# Patient Record
Sex: FEMALE | Race: OTHER | NOT HISPANIC OR LATINO | URBAN - METROPOLITAN AREA
[De-identification: names, ages, dates, MRNs, and addresses within clinical notes are randomized per-mention and may not be internally consistent; named-entity substitution may affect disease eponyms.]

---

## 2020-03-05 ENCOUNTER — INPATIENT (INPATIENT)
Facility: HOSPITAL | Age: 75
LOS: 10 days | Discharge: HOME | End: 2020-03-16
Attending: HOSPITALIST
Payer: MEDICARE

## 2020-03-05 VITALS
DIASTOLIC BLOOD PRESSURE: 76 MMHG | RESPIRATION RATE: 18 BRPM | TEMPERATURE: 98 F | OXYGEN SATURATION: 100 % | HEART RATE: 96 BPM | SYSTOLIC BLOOD PRESSURE: 161 MMHG

## 2020-03-05 DIAGNOSIS — N17.9 ACUTE KIDNEY FAILURE, UNSPECIFIED: ICD-10-CM

## 2020-03-05 LAB
ALBUMIN SERPL ELPH-MCNC: 4.2 G/DL — SIGNIFICANT CHANGE UP (ref 3.3–5)
ALP SERPL-CCNC: 65 U/L — SIGNIFICANT CHANGE UP (ref 40–120)
ALT FLD-CCNC: 6 U/L — SIGNIFICANT CHANGE UP (ref 4–33)
ANION GAP SERPL CALC-SCNC: 22 MMO/L — HIGH (ref 7–14)
APPEARANCE UR: CLEAR — SIGNIFICANT CHANGE UP
AST SERPL-CCNC: 14 U/L — SIGNIFICANT CHANGE UP (ref 4–32)
BACTERIA # UR AUTO: NEGATIVE — SIGNIFICANT CHANGE UP
BASE EXCESS BLDV CALC-SCNC: -9.8 MMOL/L — SIGNIFICANT CHANGE UP
BASOPHILS # BLD AUTO: 0.05 K/UL — SIGNIFICANT CHANGE UP (ref 0–0.2)
BASOPHILS NFR BLD AUTO: 0.9 % — SIGNIFICANT CHANGE UP (ref 0–2)
BILIRUB SERPL-MCNC: 0.3 MG/DL — SIGNIFICANT CHANGE UP (ref 0.2–1.2)
BILIRUB UR-MCNC: NEGATIVE — SIGNIFICANT CHANGE UP
BLOOD GAS VENOUS - CREATININE: 14.5 MG/DL — HIGH (ref 0.5–1.3)
BLOOD GAS VENOUS - FIO2: 21 — SIGNIFICANT CHANGE UP
BLOOD UR QL VISUAL: SIGNIFICANT CHANGE UP
BUN SERPL-MCNC: 106 MG/DL — HIGH (ref 7–23)
CALCIUM SERPL-MCNC: 9.7 MG/DL — SIGNIFICANT CHANGE UP (ref 8.4–10.5)
CHLORIDE BLDV-SCNC: 103 MMOL/L — SIGNIFICANT CHANGE UP (ref 96–108)
CHLORIDE SERPL-SCNC: 96 MMOL/L — LOW (ref 98–107)
CO2 SERPL-SCNC: 14 MMOL/L — LOW (ref 22–31)
COLOR SPEC: COLORLESS — SIGNIFICANT CHANGE UP
CREAT SERPL-MCNC: 14.04 MG/DL — HIGH (ref 0.5–1.3)
EOSINOPHIL # BLD AUTO: 0.12 K/UL — SIGNIFICANT CHANGE UP (ref 0–0.5)
EOSINOPHIL NFR BLD AUTO: 2.3 % — SIGNIFICANT CHANGE UP (ref 0–6)
GAS PNL BLDV: 131 MMOL/L — LOW (ref 136–146)
GLUCOSE BLDV-MCNC: 78 MG/DL — SIGNIFICANT CHANGE UP (ref 70–99)
GLUCOSE SERPL-MCNC: 86 MG/DL — SIGNIFICANT CHANGE UP (ref 70–99)
GLUCOSE UR-MCNC: NEGATIVE — SIGNIFICANT CHANGE UP
HCO3 BLDV-SCNC: 16 MMOL/L — LOW (ref 20–27)
HCT VFR BLD CALC: 26.8 % — LOW (ref 34.5–45)
HCT VFR BLDV CALC: 27.6 % — LOW (ref 34.5–45)
HGB BLD-MCNC: 8.6 G/DL — LOW (ref 11.5–15.5)
HGB BLDV-MCNC: 8.9 G/DL — LOW (ref 11.5–15.5)
HYALINE CASTS # UR AUTO: NEGATIVE — SIGNIFICANT CHANGE UP
IMM GRANULOCYTES NFR BLD AUTO: 0.2 % — SIGNIFICANT CHANGE UP (ref 0–1.5)
KETONES UR-MCNC: NEGATIVE — SIGNIFICANT CHANGE UP
LACTATE BLDV-MCNC: 0.8 MMOL/L — SIGNIFICANT CHANGE UP (ref 0.5–2)
LEUKOCYTE ESTERASE UR-ACNC: SIGNIFICANT CHANGE UP
LYMPHOCYTES # BLD AUTO: 1.71 K/UL — SIGNIFICANT CHANGE UP (ref 1–3.3)
LYMPHOCYTES # BLD AUTO: 32.4 % — SIGNIFICANT CHANGE UP (ref 13–44)
MCHC RBC-ENTMCNC: 30.2 PG — SIGNIFICANT CHANGE UP (ref 27–34)
MCHC RBC-ENTMCNC: 32.1 % — SIGNIFICANT CHANGE UP (ref 32–36)
MCV RBC AUTO: 94 FL — SIGNIFICANT CHANGE UP (ref 80–100)
MONOCYTES # BLD AUTO: 0.5 K/UL — SIGNIFICANT CHANGE UP (ref 0–0.9)
MONOCYTES NFR BLD AUTO: 9.5 % — SIGNIFICANT CHANGE UP (ref 2–14)
NEUTROPHILS # BLD AUTO: 2.89 K/UL — SIGNIFICANT CHANGE UP (ref 1.8–7.4)
NEUTROPHILS NFR BLD AUTO: 54.7 % — SIGNIFICANT CHANGE UP (ref 43–77)
NITRITE UR-MCNC: NEGATIVE — SIGNIFICANT CHANGE UP
NRBC # FLD: 0 K/UL — SIGNIFICANT CHANGE UP (ref 0–0)
PCO2 BLDV: 41 MMHG — SIGNIFICANT CHANGE UP (ref 41–51)
PH BLDV: 7.22 PH — LOW (ref 7.32–7.43)
PH UR: 6 — SIGNIFICANT CHANGE UP (ref 5–8)
PLATELET # BLD AUTO: 251 K/UL — SIGNIFICANT CHANGE UP (ref 150–400)
PMV BLD: 10.4 FL — SIGNIFICANT CHANGE UP (ref 7–13)
PO2 BLDV: 27 MMHG — LOW (ref 35–40)
POTASSIUM BLDV-SCNC: 4.9 MMOL/L — HIGH (ref 3.4–4.5)
POTASSIUM SERPL-MCNC: 5.3 MMOL/L — SIGNIFICANT CHANGE UP (ref 3.5–5.3)
POTASSIUM SERPL-SCNC: 5.3 MMOL/L — SIGNIFICANT CHANGE UP (ref 3.5–5.3)
PROT SERPL-MCNC: 7.4 G/DL — SIGNIFICANT CHANGE UP (ref 6–8.3)
PROT UR-MCNC: 20 — SIGNIFICANT CHANGE UP
RBC # BLD: 2.85 M/UL — LOW (ref 3.8–5.2)
RBC # FLD: 13.2 % — SIGNIFICANT CHANGE UP (ref 10.3–14.5)
RBC CASTS # UR COMP ASSIST: SIGNIFICANT CHANGE UP (ref 0–?)
SAO2 % BLDV: 35.2 % — LOW (ref 60–85)
SODIUM SERPL-SCNC: 132 MMOL/L — LOW (ref 135–145)
SP GR SPEC: 1.01 — SIGNIFICANT CHANGE UP (ref 1–1.04)
SQUAMOUS # UR AUTO: SIGNIFICANT CHANGE UP
UROBILINOGEN FLD QL: NORMAL — SIGNIFICANT CHANGE UP
WBC # BLD: 5.28 K/UL — SIGNIFICANT CHANGE UP (ref 3.8–10.5)
WBC # FLD AUTO: 5.28 K/UL — SIGNIFICANT CHANGE UP (ref 3.8–10.5)
WBC UR QL: SIGNIFICANT CHANGE UP (ref 0–?)

## 2020-03-05 PROCEDURE — 99223 1ST HOSP IP/OBS HIGH 75: CPT

## 2020-03-05 PROCEDURE — 76770 US EXAM ABDO BACK WALL COMP: CPT | Mod: 26

## 2020-03-05 NOTE — ED PROVIDER NOTE - ATTENDING CONTRIBUTION TO CARE
75 year f evaluation of abnormal blood and urine results. States that for the last 2 weeks she has been having weakness, fatigue, nausea, intermittent gradual onset diffuse ha, loss of appetite, and 6 months of intermittent abdominal/back pain a/w less frequent urination and foamy appearance to it. She reports that her urine is foamy but does not report burning or pain with urination or bloody urine. She also has had chills, tremors, dry skin, and beige stools.  She takes one Advil when the pain does not resolve on its own. Denies fevers, vomiting, chest pain, trouble breathing. PEr collateral from on call physician - showed  Cr 14 Urine protein 300+ Hgb 9.1 HCT 28 K 6.2 and she was told to come to this ED. Patient states in ED she has no complaints-no pain, no nausea.  exam  GEN - NAD;  nontoxic appearing; A+O x3   HEAD - NC/AT   EYES- PERRL, EOMI  ENT: Airway patent, mmm, Oral cavity and pharynx normal. No inflammation, swelling, exudate, or lesions.  NECK: Neck supple, non-tender without lymphadenopathy, no masses.  PULMONARY - CTA b/l, symmetric breath sounds.   CARDIAC -s1s2, RRR, no M,G,R  ABDOMEN - +BS, ND, NT, soft, no guarding, no rebound, no masses   BACK - no CVA tenderness, Normal  spine   EXTREMITIES - FROM, symmetric pulses, capillary refill < 2 seconds, no edema   SKIN - no rash or bruising   NEUROLOGIC - alert, speech clear, no focal deficits  PSYCH -nl mood/affect, nl insight.  a/p-patient with new kidney failure on labs after onset of several symptoms. Nontoxic appearing on exam, will check labs to confirm outpatient labs, ekg, monitor, reass.

## 2020-03-05 NOTE — H&P ADULT - HISTORY OF PRESENT ILLNESS
Pt is a 75 year old woman with a PMHx DM type 2 on Metformin,  COPD, Anemia, HTN on no meds, OA,  presenting for evaluation of abnormal blood and urine results. She reports that for the last 2 weeks she has been having weakness, fatigue, nausea, headache, loss of appetite, and abdominal/back pain. She also has been urinating less frequently during this time and feels like she has to urinate but has trouble getting it out. She reports that her urine is foamy but does not report burning or pain with urination or bloody urine. She also has had chills, tremors, dry skin, and beige stools. Her abdominal/back pain has been intermittent over the last 6 months. She describes it as an occasional sharp shooting pain throughout her abdomen that she attributes to gas, but thinks that her back pain is from her kidney. She takes one Advil when the pain does not resolve on its own. She has not had fevers, vomiting.  She reports that she used to get frequent UTIs but hasn't since she has retired, and otherwise has no known kidney issues. Her PCP is Dr. Scott [phone]. ER  spoke with the on call physician who reported  that her lab results today showed  Cr 14 Urine protein 300+ Hgb 9.1 HCT 28 K 6.2 and she was told to come to this ED. Pt c/o intermittent Chest pain and SOB, CP is sharp , lasts only for a few minutes, NO CP, NO SOB now, + Wt loss 6-7 pounds in 3 weeks, no fever, no cough, no diarrhea, NO HA, no Dizziness, + Nausea, no Vomit, pt is a EX Smoker,  c/o Legs pain as well chronic but no legs edema, + Heart Burn occasionally, pt is A+P x 4, no distress, has been seen by Renal as well tonight, Renal sonogram c/w: Medical Renal disease, Pt is a 75 year old woman with a PMHx DM type 2 on Metformin,  COPD, Anemia, HTN on no meds, OA,  presenting for evaluation of abnormal blood and urine results. She reports that for the last 2 weeks she has been having weakness, fatigue, nausea, headache, loss of appetite, and abdominal/back pain. She also has been urinating less frequently during this time and feels like she has to urinate but has trouble getting it out. She reports that her urine is foamy but does not report burning or pain with urination or bloody urine. She also has had chills, tremors, dry skin, and beige stools. Her abdominal/back pain has been intermittent over the last 6 months. She describes it as an occasional sharp shooting pain throughout her abdomen that she attributes to gas, but thinks that her back pain is from her kidney. She takes one Advil when the pain does not resolve on its own. She has not had fevers, vomiting.  She reports that she used to get frequent UTIs but hasn't since she has retired, and otherwise has no known kidney issues. Her PCP is Dr. Scott [phone]. ER  spoke with the on call physician who reported  that her lab results today showed  Cr 14 Urine protein 300+ Hgb 9.1 HCT 28 K 6.2 and she was told to come to this ED. Pt c/o intermittent Chest pain and SOB, CP is sharp , lasts only for a few minutes, NO CP, NO SOB now, + Wt loss 6-7 pounds in 3 weeks, no fever, no cough, no diarrhea, NO HA, no Dizziness, + Nausea, no Vomit, pt is a EX Smoker,  c/o Legs pain as well chronic but no legs edema, + Heart Burn occasionally, pt is A+P x 4, no distress, has been seen by Renal as well tonight, Renal sonogram c/w: Medical Renal disease, Last EGD/Colonoscopy 3 years ago as per pt unremarkable, no family HX  of CKD,     Labs: UA: trace Blood, Protein 20, Trace Leuk Est.,   Lactate 0.8, Na 132, K+ 5.3, AG 22, , Creatinine 14.04, Glucose 86, LFT Normal,  WBC 5.28, Hgb 8.6, Platelet 251,     Vitals: Tem 98.9, HR 83, /60, RR 17, 100% RA ,

## 2020-03-05 NOTE — H&P ADULT - NSICDXPASTMEDICALHX_GEN_ALL_CORE_FT
PAST MEDICAL HISTORY:  DM (diabetes mellitus) PAST MEDICAL HISTORY:  Chronic obstructive pulmonary disease (COPD)     DM (diabetes mellitus)     DM (diabetes mellitus), type 2     Former smoker     HTN (hypertension)     Leg pain, bilateral     OA (osteoarthritis) PAST MEDICAL HISTORY:  Anemia     Chronic obstructive pulmonary disease (COPD)     DM (diabetes mellitus)     DM (diabetes mellitus), type 2     Former smoker     HTN (hypertension)     Leg pain, bilateral     OA (osteoarthritis)

## 2020-03-05 NOTE — ED PROVIDER NOTE - NS ED ROS FT
REVIEW OF SYSTEMS    General:	+weakness +fatigue +nausea +tremors   HEENT: +yellowing of eyes  Respiratory: no cough, wheezing, SOB  Cardiovascular: no chest pain, palpitations  Gastrointestinal: +abdominal pain +constipation +light stools  Genitourinary: +flank pain +less frequent urination +difficulty urinating; no hematuria  Neurological: no focal weakness, tingling, loss of sensation

## 2020-03-05 NOTE — ED PROVIDER NOTE - OBJECTIVE STATEMENT
Stephany Frederick is a 75 year old woman with a PMHx diabetes and COPD presenting for evaluation of abnormal blood and urine results. She reports that for the last 2 weeks she has been having weakness, fatigue, nausea, headache, loss of appetite, and abdominal/back pain. She also has been urinating less frequently during this time and feels like she has to urinate but has trouble getting it out. She reports that her urine is foamy but does not report burning or pain with urination or bloody urine. She also has had chills, tremors, dry skin, and beige stools. Her abdominal/back pain has been intermittent over the last 6 months. She describes it as an occasional sharp shooting pain throughout her abdomen that she attributes to gas, but thinks that her back pain is from her kidney. She takes one Advil when the pain does not resolve on its own. She has not had fevers, vomiting, chest pain, trouble breathing. She reports that she used to get frequent UTIs but hasn't since she retired, and otherwise has no known kidney issues. Her PCP is Dr. Scott [phone]. I spoke with the on call physician who reports that her lab results today showed  Cr 14 Urine protein 300+ Hgb 9.1 HCT 28 K 6.2 and she was told to come to this ED.

## 2020-03-05 NOTE — ED ADULT TRIAGE NOTE - CHIEF COMPLAINT QUOTE
Pt was instructed to come to ED for possible renal injury after having urine studies performed in MD office. Pt has h/o diabetes and COPD, denies any present pain.

## 2020-03-05 NOTE — H&P ADULT - NSICDXPASTSURGICALHX_GEN_ALL_CORE_FT
PAST SURGICAL HISTORY:  No significant past surgical history PAST SURGICAL HISTORY:  S/P      S/P hysterectomy for Fibroid as per pt?

## 2020-03-05 NOTE — H&P ADULT - ASSESSMENT
Pt is a 75 year old woman with a PMHx DM type 2 on Metformin,  COPD, Anemia, HTN on no meds, OA,  presenting for evaluation of abnormal blood and urine results. She reports that for the last 2 weeks she has been having weakness, fatigue, nausea, headache, loss of appetite, and abdominal/back pain. She also has been urinating less frequently during this time and feels like she has to urinate but has trouble getting it out. She reports that her urine is foamy but does not report burning or pain with urination or bloody urine. She also has had chills, tremors, dry skin, and beige stools. Her abdominal/back pain has been intermittent over the last 6 months. She describes it as an occasional sharp shooting pain throughout her abdomen that she attributes to gas, but thinks that her back pain is from her kidney. She takes one Advil when the pain does not resolve on its own. She has not had fevers, vomiting.  She reports that she used to get frequent UTIs but hasn't since she has retired, and otherwise has no known kidney issues. Her PCP is Dr. Scott [phone]. ER  spoke with the on call physician who reported  that her lab results today showed  Cr 14 Urine protein 300+ Hgb 9.1 HCT 28 K 6.2 and she was told to come to this ED. Pt c/o intermittent Chest pain and SOB, CP is sharp , lasts only for a few minutes, NO CP, NO SOB now, + Wt loss 6-7 pounds in 3 weeks, no fever, no cough, no diarrhea, NO HA, no Dizziness, + Nausea, no Vomit, pt is a EX Smoker,  c/o Legs pain as well chronic but no legs edema, + Heart Burn occasionally, pt is A+P x 4, no distress, has been seen by Renal as well tonight, Renal sonogram c/w: Medical Renal disease, Last EGD/Colonoscopy 3 years ago as per pt unremarkable, no family HX  of CKD,

## 2020-03-05 NOTE — ED PROVIDER NOTE - CLINICAL SUMMARY MEDICAL DECISION MAKING FREE TEXT BOX
Stephany Frederick is a 75 year old woman with PMHx diabetes and COPD presenting with 2 weeks of uremic symptoms, Cr 14  Urine Protein 300+ K 6.2 and anemia. Pt with Cr 1.4 in August 2019. Acute vs chronic renal failure. Could be obstructive (pt with difficulty urinating and less frequent urination) vs diabetic nephropathy (less likely to progress that rapidly from normal Cr in August 2019) vs myeloma kidney (renal failure and anemia) vs renal Ca. The on-call physician from her practice would like the [facility] to see her [phone]. CBC, CMP, VBG, UA. Renal ultrasound.

## 2020-03-05 NOTE — ED ADULT NURSE REASSESSMENT NOTE - NS ED NURSE REASSESS COMMENT FT1
endorsed to essu 2 rn. pt a&o x3, ambulatory. offers no medical complaints at this time. nad noted. safety maintained

## 2020-03-05 NOTE — H&P ADULT - PROBLEM SELECTOR PLAN 5
BP Stable on No meds, ECG, ECHO,  Chest X ray,   Avoid ACEI/ARB, + ARF,   if BP elevated consider Norvasc or Hydralazine,

## 2020-03-05 NOTE — ED PROVIDER NOTE - PHYSICAL EXAMINATION
PHYSICAL EXAM:    General: sitting in up in bed, pleasant, in no acute distress  HEENT: scleral icterus, no cervical lymphadenopathy  Respiratory: clear breath sounds in all lung fields bilaterally  Cardiovascular: tachycardic, RRR, S1 S2 without murmurs; no peripheral edema  Gastrointestinal: abdomen soft, nondistended; right-sided tenderness and guarding with palpation; no hepatosplenomegaly seen  Genitourinary: no flank pain bilaterally  Neurological: CN II-XII grossly intact; strength and sensation equal bilaterally in all limbs  Skin: dry skin  Psychiatric: AAOx3 PHYSICAL EXAM:    General: sitting in up in bed, pleasant, in no acute distress  HEENT: scleral icterus, no cervical lymphadenopathy  Respiratory: clear breath sounds in all lung fields bilaterally  Cardiovascular:  RRR, S1 S2 without murmurs; no peripheral edema  Gastrointestinal: abdomen soft, nondistended; nontender; no hepatosplenomegaly seen  Genitourinary: no flank pain bilaterally  Neurological: CN II-XII grossly intact; strength and sensation equal bilaterally in all limbs  Skin: dry skin  Psychiatric: AAOx3

## 2020-03-05 NOTE — PROGRESS NOTE ADULT - SUBJECTIVE AND OBJECTIVE BOX
Chart reviewed  Full consult to follow    A/P:  CINDA:  With minimal protein and RBC  US no hydro, mild fullness of right collecting system can't explain this CINDA  Bilateral small kidney  Rule out myeloma kidney  May need kidney biopsy    Suggest:   Urine protein/cr ratio, SPEP, serum immunofixation, serum free light chain, Hep B, C, MARGARITA, C3, C4, ANCA  CT abdomen pelvis without contrast   Low K diet no abdominal pain, no bloating, no constipation, no diarrhea, no nausea and no vomiting.

## 2020-03-05 NOTE — H&P ADULT - ATTENDING COMMENTS
Pt was seen & examined by me, Dr. WILLIAM Rivas on 3/5/2020.     Dr. Garrido will resume the care of pt in AM.

## 2020-03-05 NOTE — H&P ADULT - PROBLEM SELECTOR PLAN 1
ARF, + Anemia: Unclear etiology, Renal follow up, R/O Malignancy, CT A/P No contrast, Chest X ray, ECHO, F/U CBC, CMP, + Anemia, Iron Studies, Ferritin, Bit B12m Folate, SPEP, UPEP, serum and Urine immunofixation, C3, C4, RF, MARGARITA, ANCA, HIV Test, Hep A,B,C profile, LDH, Hgb Electrophoresis, LDH, Uric Acid, Serum PTH intact, Vit D 1-25 Dihydroxy , Vit D 25 Hydroxy, Erythropoietin Level, Urine Creatinine, Retic Count, Serum Haptoglobin,   R/O Multiple Myeloma,   ECG,  Avoid Nephrotoxic agents, Avoid NSAID, Renal Diet, Low K+ diet, ARF, + Anemia: Unclear etiology, Renal follow up, R/O Malignancy, CT A/P No contrast, Chest X ray, ECHO, F/U CBC, CMP, + Anemia, Iron Studies, Ferritin, Vit B12,  Folate, SPEP, UPEP, serum and Urine immunofixation, C3, C4, RF, MARGARITA, ANCA, HIV Test, Hep A,B,C profile, LDH, Hgb Electrophoresis, LDH, Uric Acid, Serum PTH intact, Vit D 1-25 Dihydroxy , Vit D 25 Hydroxy, Erythropoietin Level, Urine Creatinine, Retic Count, Serum Haptoglobin,   R/O Multiple Myeloma,   ECG,  Avoid Nephrotoxic agents, Avoid NSAID, Renal Diet, Low K+ diet,

## 2020-03-05 NOTE — ED ADULT NURSE NOTE - OBJECTIVE STATEMENT
75F hx of DM, COPD sent to ED with abnormal lab results, increased creatinine and potassium. Pt reports intermittent weakness, fatigue, decreased PO intake and abdominal pain. Denies fever, chills, dysuria, cough, bloody stools, chest pain or SOB.

## 2020-03-06 DIAGNOSIS — Z90.710 ACQUIRED ABSENCE OF BOTH CERVIX AND UTERUS: Chronic | ICD-10-CM

## 2020-03-06 DIAGNOSIS — M79.604 PAIN IN RIGHT LEG: ICD-10-CM

## 2020-03-06 DIAGNOSIS — R09.89 OTHER SPECIFIED SYMPTOMS AND SIGNS INVOLVING THE CIRCULATORY AND RESPIRATORY SYSTEMS: ICD-10-CM

## 2020-03-06 DIAGNOSIS — J44.9 CHRONIC OBSTRUCTIVE PULMONARY DISEASE, UNSPECIFIED: ICD-10-CM

## 2020-03-06 DIAGNOSIS — E11.9 TYPE 2 DIABETES MELLITUS WITHOUT COMPLICATIONS: ICD-10-CM

## 2020-03-06 DIAGNOSIS — N17.9 ACUTE KIDNEY FAILURE, UNSPECIFIED: ICD-10-CM

## 2020-03-06 DIAGNOSIS — I10 ESSENTIAL (PRIMARY) HYPERTENSION: ICD-10-CM

## 2020-03-06 DIAGNOSIS — Z98.891 HISTORY OF UTERINE SCAR FROM PREVIOUS SURGERY: Chronic | ICD-10-CM

## 2020-03-06 DIAGNOSIS — Z29.9 ENCOUNTER FOR PROPHYLACTIC MEASURES, UNSPECIFIED: ICD-10-CM

## 2020-03-06 LAB
ALBUMIN SERPL ELPH-MCNC: 3.9 G/DL — SIGNIFICANT CHANGE UP (ref 3.3–5)
ALP SERPL-CCNC: 65 U/L — SIGNIFICANT CHANGE UP (ref 40–120)
ALT FLD-CCNC: 5 U/L — SIGNIFICANT CHANGE UP (ref 4–33)
ANION GAP SERPL CALC-SCNC: 18 MMO/L — HIGH (ref 7–14)
APPEARANCE UR: CLEAR — SIGNIFICANT CHANGE UP
APTT BLD: 27.2 SEC — LOW (ref 27.5–36.3)
AST SERPL-CCNC: 12 U/L — SIGNIFICANT CHANGE UP (ref 4–32)
BASOPHILS # BLD AUTO: 0.05 K/UL — SIGNIFICANT CHANGE UP (ref 0–0.2)
BASOPHILS NFR BLD AUTO: 1 % — SIGNIFICANT CHANGE UP (ref 0–2)
BILIRUB SERPL-MCNC: 0.3 MG/DL — SIGNIFICANT CHANGE UP (ref 0.2–1.2)
BILIRUB UR-MCNC: NEGATIVE — SIGNIFICANT CHANGE UP
BLOOD UR QL VISUAL: SIGNIFICANT CHANGE UP
BUN SERPL-MCNC: 103 MG/DL — HIGH (ref 7–23)
C3 SERPL-MCNC: 91.1 MG/DL — SIGNIFICANT CHANGE UP (ref 90–180)
C4 SERPL-MCNC: 34.2 MG/DL — SIGNIFICANT CHANGE UP (ref 10–40)
CALCIUM SERPL-MCNC: 9.4 MG/DL — SIGNIFICANT CHANGE UP (ref 8.4–10.5)
CHLORIDE SERPL-SCNC: 101 MMOL/L — SIGNIFICANT CHANGE UP (ref 98–107)
CHOLEST SERPL-MCNC: 183 MG/DL — SIGNIFICANT CHANGE UP (ref 120–199)
CO2 SERPL-SCNC: 15 MMOL/L — LOW (ref 22–31)
COLOR SPEC: COLORLESS — SIGNIFICANT CHANGE UP
CREAT ?TM UR-MCNC: 36.9 MG/DL — SIGNIFICANT CHANGE UP
CREAT SERPL-MCNC: 13.09 MG/DL — HIGH (ref 0.5–1.3)
EOSINOPHIL # BLD AUTO: 0.19 K/UL — SIGNIFICANT CHANGE UP (ref 0–0.5)
EOSINOPHIL NFR BLD AUTO: 3.7 % — SIGNIFICANT CHANGE UP (ref 0–6)
EPI CELLS # UR: SIGNIFICANT CHANGE UP
FERRITIN SERPL-MCNC: 152.9 NG/ML — HIGH (ref 15–150)
FOLATE SERPL-MCNC: 6.1 NG/ML — SIGNIFICANT CHANGE UP (ref 4.7–20)
FRUCTOSAMINE SERPL-MCNC: 282 UMOL/L — SIGNIFICANT CHANGE UP (ref 205–285)
GLUCOSE SERPL-MCNC: 92 MG/DL — SIGNIFICANT CHANGE UP (ref 70–99)
GLUCOSE UR-MCNC: 30 — SIGNIFICANT CHANGE UP
HAPTOGLOB SERPL-MCNC: 183 MG/DL — SIGNIFICANT CHANGE UP (ref 34–200)
HAV IGM SER-ACNC: NONREACTIVE — SIGNIFICANT CHANGE UP
HBA1C BLD-MCNC: 6 % — HIGH (ref 4–5.6)
HBV CORE IGM SER-ACNC: NONREACTIVE — SIGNIFICANT CHANGE UP
HBV SURFACE AG SER-ACNC: NONREACTIVE — SIGNIFICANT CHANGE UP
HCT VFR BLD CALC: 26 % — LOW (ref 34.5–45)
HCV AB S/CO SERPL IA: 0.1 S/CO — SIGNIFICANT CHANGE UP (ref 0–0.99)
HCV AB SERPL-IMP: SIGNIFICANT CHANGE UP
HDLC SERPL-MCNC: 35 MG/DL — LOW (ref 45–65)
HGB A MFR BLD: 96.6 % — SIGNIFICANT CHANGE UP
HGB A2 MFR BLD: 3 % — SIGNIFICANT CHANGE UP (ref 2.4–3.5)
HGB BLD-MCNC: 8.6 G/DL — LOW (ref 11.5–15.5)
HGB ELECT COMMENT: SIGNIFICANT CHANGE UP
HGB F MFR BLD: < 1 % — SIGNIFICANT CHANGE UP (ref 0–1.5)
IMM GRANULOCYTES NFR BLD AUTO: 0.2 % — SIGNIFICANT CHANGE UP (ref 0–1.5)
INR BLD: 0.93 — SIGNIFICANT CHANGE UP (ref 0.88–1.17)
IRON SATN MFR SERPL: 107 UG/DL — SIGNIFICANT CHANGE UP (ref 30–160)
IRON SATN MFR SERPL: 252 UG/DL — SIGNIFICANT CHANGE UP (ref 140–530)
KETONES UR-MCNC: NEGATIVE — SIGNIFICANT CHANGE UP
LDH SERPL L TO P-CCNC: 188 U/L — SIGNIFICANT CHANGE UP (ref 135–225)
LEUKOCYTE ESTERASE UR-ACNC: NEGATIVE — SIGNIFICANT CHANGE UP
LIPID PNL WITH DIRECT LDL SERPL: 116 MG/DL — SIGNIFICANT CHANGE UP
LYMPHOCYTES # BLD AUTO: 1.36 K/UL — SIGNIFICANT CHANGE UP (ref 1–3.3)
LYMPHOCYTES # BLD AUTO: 26.3 % — SIGNIFICANT CHANGE UP (ref 13–44)
MAGNESIUM SERPL-MCNC: 2.3 MG/DL — SIGNIFICANT CHANGE UP (ref 1.6–2.6)
MCHC RBC-ENTMCNC: 30.4 PG — SIGNIFICANT CHANGE UP (ref 27–34)
MCHC RBC-ENTMCNC: 33.1 % — SIGNIFICANT CHANGE UP (ref 32–36)
MCV RBC AUTO: 91.9 FL — SIGNIFICANT CHANGE UP (ref 80–100)
MONOCYTES # BLD AUTO: 0.43 K/UL — SIGNIFICANT CHANGE UP (ref 0–0.9)
MONOCYTES NFR BLD AUTO: 8.3 % — SIGNIFICANT CHANGE UP (ref 2–14)
NEUTROPHILS # BLD AUTO: 3.14 K/UL — SIGNIFICANT CHANGE UP (ref 1.8–7.4)
NEUTROPHILS NFR BLD AUTO: 60.5 % — SIGNIFICANT CHANGE UP (ref 43–77)
NITRITE UR-MCNC: NEGATIVE — SIGNIFICANT CHANGE UP
NRBC # FLD: 0 K/UL — SIGNIFICANT CHANGE UP (ref 0–0)
PH UR: 6.5 — SIGNIFICANT CHANGE UP (ref 5–8)
PHOSPHATE SERPL-MCNC: 8.3 MG/DL — HIGH (ref 2.5–4.5)
PLATELET # BLD AUTO: 255 K/UL — SIGNIFICANT CHANGE UP (ref 150–400)
PMV BLD: 10.2 FL — SIGNIFICANT CHANGE UP (ref 7–13)
POTASSIUM SERPL-MCNC: 5.1 MMOL/L — SIGNIFICANT CHANGE UP (ref 3.5–5.3)
POTASSIUM SERPL-SCNC: 5.1 MMOL/L — SIGNIFICANT CHANGE UP (ref 3.5–5.3)
PROT SERPL-MCNC: 7 G/DL — SIGNIFICANT CHANGE UP (ref 6–8.3)
PROT UR-MCNC: 11.8 MG/DL — SIGNIFICANT CHANGE UP
PROT UR-MCNC: 20 — SIGNIFICANT CHANGE UP
PROTHROM AB SERPL-ACNC: 10.7 SEC — SIGNIFICANT CHANGE UP (ref 9.8–13.1)
PTH-INTACT SERPL-MCNC: 238.9 PG/ML — HIGH (ref 15–65)
RBC # BLD: 2.83 M/UL — LOW (ref 3.8–5.2)
RBC # FLD: 13 % — SIGNIFICANT CHANGE UP (ref 10.3–14.5)
RBC CASTS # UR COMP ASSIST: SIGNIFICANT CHANGE UP (ref 0–?)
RETICS #: 18 K/UL — LOW (ref 25–125)
RETICS/RBC NFR: 0.6 % — SIGNIFICANT CHANGE UP (ref 0.5–2.5)
SODIUM SERPL-SCNC: 134 MMOL/L — LOW (ref 135–145)
SP GR SPEC: 1.01 — SIGNIFICANT CHANGE UP (ref 1–1.04)
T3 SERPL-MCNC: 47.7 NG/DL — LOW (ref 80–200)
T4 AB SER-ACNC: 6.38 UG/DL — SIGNIFICANT CHANGE UP (ref 5.1–13)
TRIGL SERPL-MCNC: 130 MG/DL — SIGNIFICANT CHANGE UP (ref 10–149)
TSH SERPL-MCNC: 1.47 UIU/ML — SIGNIFICANT CHANGE UP (ref 0.27–4.2)
UIBC SERPL-MCNC: 145.2 UG/DL — SIGNIFICANT CHANGE UP (ref 110–370)
URATE SERPL-MCNC: 5.2 MG/DL — SIGNIFICANT CHANGE UP (ref 2.5–7)
UROBILINOGEN FLD QL: NORMAL — SIGNIFICANT CHANGE UP
VIT B12 SERPL-MCNC: 1570 PG/ML — HIGH (ref 200–900)
WBC # BLD: 5.18 K/UL — SIGNIFICANT CHANGE UP (ref 3.8–10.5)
WBC # FLD AUTO: 5.18 K/UL — SIGNIFICANT CHANGE UP (ref 3.8–10.5)
WBC UR QL: SIGNIFICANT CHANGE UP (ref 0–?)

## 2020-03-06 PROCEDURE — 86335 IMMUNFIX E-PHORSIS/URINE/CSF: CPT | Mod: 26

## 2020-03-06 PROCEDURE — 86334 IMMUNOFIX E-PHORESIS SERUM: CPT | Mod: 26

## 2020-03-06 PROCEDURE — 74176 CT ABD & PELVIS W/O CONTRAST: CPT | Mod: 26

## 2020-03-06 PROCEDURE — 71045 X-RAY EXAM CHEST 1 VIEW: CPT | Mod: 26

## 2020-03-06 PROCEDURE — 93970 EXTREMITY STUDY: CPT | Mod: 26

## 2020-03-06 PROCEDURE — 84166 PROTEIN E-PHORESIS/URINE/CSF: CPT | Mod: 26

## 2020-03-06 RX ORDER — SODIUM BICARBONATE 1 MEQ/ML
650 SYRINGE (ML) INTRAVENOUS THREE TIMES A DAY
Refills: 0 | Status: DISCONTINUED | OUTPATIENT
Start: 2020-03-06 | End: 2020-03-07

## 2020-03-06 RX ORDER — TIOTROPIUM BROMIDE 18 UG/1
1 CAPSULE ORAL; RESPIRATORY (INHALATION) DAILY
Refills: 0 | Status: DISCONTINUED | OUTPATIENT
Start: 2020-03-06 | End: 2020-03-16

## 2020-03-06 RX ORDER — DEXTROSE 50 % IN WATER 50 %
25 SYRINGE (ML) INTRAVENOUS ONCE
Refills: 0 | Status: DISCONTINUED | OUTPATIENT
Start: 2020-03-06 | End: 2020-03-16

## 2020-03-06 RX ORDER — DEXTROSE 50 % IN WATER 50 %
15 SYRINGE (ML) INTRAVENOUS ONCE
Refills: 0 | Status: DISCONTINUED | OUTPATIENT
Start: 2020-03-06 | End: 2020-03-16

## 2020-03-06 RX ORDER — BUDESONIDE AND FORMOTEROL FUMARATE DIHYDRATE 160; 4.5 UG/1; UG/1
2 AEROSOL RESPIRATORY (INHALATION)
Qty: 0 | Refills: 0 | DISCHARGE

## 2020-03-06 RX ORDER — SODIUM CHLORIDE 9 MG/ML
1000 INJECTION, SOLUTION INTRAVENOUS
Refills: 0 | Status: DISCONTINUED | OUTPATIENT
Start: 2020-03-06 | End: 2020-03-16

## 2020-03-06 RX ORDER — DEXTROSE 50 % IN WATER 50 %
12.5 SYRINGE (ML) INTRAVENOUS ONCE
Refills: 0 | Status: DISCONTINUED | OUTPATIENT
Start: 2020-03-06 | End: 2020-03-16

## 2020-03-06 RX ORDER — BUDESONIDE AND FORMOTEROL FUMARATE DIHYDRATE 160; 4.5 UG/1; UG/1
2 AEROSOL RESPIRATORY (INHALATION)
Refills: 0 | Status: DISCONTINUED | OUTPATIENT
Start: 2020-03-06 | End: 2020-03-16

## 2020-03-06 RX ORDER — INSULIN LISPRO 100/ML
VIAL (ML) SUBCUTANEOUS AT BEDTIME
Refills: 0 | Status: DISCONTINUED | OUTPATIENT
Start: 2020-03-06 | End: 2020-03-16

## 2020-03-06 RX ORDER — GLUCAGON INJECTION, SOLUTION 0.5 MG/.1ML
1 INJECTION, SOLUTION SUBCUTANEOUS ONCE
Refills: 0 | Status: DISCONTINUED | OUTPATIENT
Start: 2020-03-06 | End: 2020-03-16

## 2020-03-06 RX ORDER — INSULIN LISPRO 100/ML
VIAL (ML) SUBCUTANEOUS
Refills: 0 | Status: DISCONTINUED | OUTPATIENT
Start: 2020-03-06 | End: 2020-03-16

## 2020-03-06 RX ORDER — TIOTROPIUM BROMIDE 18 UG/1
1 CAPSULE ORAL; RESPIRATORY (INHALATION)
Qty: 0 | Refills: 0 | DISCHARGE

## 2020-03-06 RX ORDER — HEPARIN SODIUM 5000 [USP'U]/ML
5000 INJECTION INTRAVENOUS; SUBCUTANEOUS EVERY 8 HOURS
Refills: 0 | Status: COMPLETED | OUTPATIENT
Start: 2020-03-06 | End: 2020-03-08

## 2020-03-06 RX ORDER — LORATADINE 10 MG/1
10 TABLET ORAL DAILY
Refills: 0 | Status: DISCONTINUED | OUTPATIENT
Start: 2020-03-06 | End: 2020-03-16

## 2020-03-06 RX ADMIN — BUDESONIDE AND FORMOTEROL FUMARATE DIHYDRATE 2 PUFF(S): 160; 4.5 AEROSOL RESPIRATORY (INHALATION) at 12:18

## 2020-03-06 RX ADMIN — Medication 650 MILLIGRAM(S): at 23:07

## 2020-03-06 RX ADMIN — HEPARIN SODIUM 5000 UNIT(S): 5000 INJECTION INTRAVENOUS; SUBCUTANEOUS at 05:33

## 2020-03-06 RX ADMIN — TIOTROPIUM BROMIDE 1 CAPSULE(S): 18 CAPSULE ORAL; RESPIRATORY (INHALATION) at 12:18

## 2020-03-06 RX ADMIN — BUDESONIDE AND FORMOTEROL FUMARATE DIHYDRATE 2 PUFF(S): 160; 4.5 AEROSOL RESPIRATORY (INHALATION) at 23:07

## 2020-03-06 RX ADMIN — HEPARIN SODIUM 5000 UNIT(S): 5000 INJECTION INTRAVENOUS; SUBCUTANEOUS at 23:07

## 2020-03-06 NOTE — CONSULT NOTE ADULT - SUBJECTIVE AND OBJECTIVE BOX
[facility]   MD ABDELRAHMAN TOPETE DO ANAM SIDDIQUI ANGELA WONG, PA    TEL:  OFFICE: [phone]  DR MENDOZA CELL: [phone]  DR. SANTANA CELL: [phone]  DR. MCKEON CELL: [phone]  VALENCIA WELCH CELL: [phone]    HPI:  75 year old woman with a PMHx DM type 2 on Metformin,  COPD, Anemia, HTN on no meds, OA,  presenting for evaluation of abnormal blood and urine results.  She reports that for the last 2 weeks she has been having weakness, fatigue, nausea, headache, loss of appetite, and abdominal/back pain. She also has been urinating less frequently during this time and feels like she has to urinate but has trouble getting it out. saw PCP who did blood work and was told to come to ED sec to wrosen renal failure. scr 1.4 in aug 2019    Allergies:  No Known Allergies      PAST MEDICAL & SURGICAL HISTORY:  Anemia  Leg pain, bilateral  OA (osteoarthritis)  Former smoker  Chronic obstructive pulmonary disease (COPD)  HTN (hypertension)  DM (diabetes mellitus), type 2  DM (diabetes mellitus)  S/P   S/P hysterectomy: for Fibroid as per pt?      Home Medications Reviewed    Hospital Medications:   MEDICATIONS  (STANDING):  budesonide 160 MICROgram(s)/formoterol 4.5 MICROgram(s) Inhaler 2 Puff(s) Inhalation two times a day  dextrose 5%. 1000 milliLiter(s) (50 mL/Hr) IV Continuous <Continuous>  dextrose 50% Injectable 12.5 Gram(s) IV Push once  dextrose 50% Injectable 25 Gram(s) IV Push once  dextrose 50% Injectable 25 Gram(s) IV Push once  heparin  Injectable 5000 Unit(s) SubCutaneous every 8 hours  insulin lispro (HumaLOG) corrective regimen sliding scale   SubCutaneous at bedtime  insulin lispro (HumaLOG) corrective regimen sliding scale   SubCutaneous three times a day before meals  tiotropium 18 MICROgram(s) Capsule 1 Capsule(s) Inhalation daily      SOCIAL HISTORY:  former Smoking,     FAMILY HISTORY:  FHx: stomach cancer  Family history of diabetes mellitus (DM)      REVIEW OF SYSTEMS:  CONSTITUTIONAL:+ weakness, no fevers or chills  EYES/ENT: No visual changes;  No vertigo or throat pain   NECK: No pain or stiffness  RESPIRATORY: No cough, wheezing, hemoptysis; No shortness of breath  CARDIOVASCULAR: + chest pain or palpitations.  GASTROINTESTINAL: No abdominal or epigastric pain. No nausea, vomiting, or hematemesis; No diarrhea or constipation. No melena or hematochezia.  GENITOURINARY: No dysuria, frequency, foamy urine, urinary urgency, incontinence or hematuria  NEUROLOGICAL: No numbness or weakness  SKIN: No itching, burning, rashes, or lesions   VASCULAR: No bilateral lower extremity edema.   All other review of systems is negative unless indicated above.    VITALS:  T(F): 97.9 (20 @ 13:30), Max: 98.9 (20 @ 23:50)  HR: 82 (20 @ 13:30)  BP: 130/72 (20 @ 13:30)  RR: 18 (20 @ 13:30)  SpO2: 100% (20 @ 13:30)  Wt(kg): --     @ 07:01  -   @ 07:00  --------------------------------------------------------  IN: 0 mL / OUT: 400 mL / NET: -400 mL          PHYSICAL EXAM:  Constitutional: NAD  HEENT: anicteric sclera, oropharynx clear, MMM  Neck: No JVD  Respiratory: CTAB, no wheezes, rales or rhonchi  Cardiovascular: S1, S2, RRR  Gastrointestinal: BS+, soft, NT/ND  Extremities: No cyanosis or clubbing. No peripheral edema  Neurological: A/O x 3, no focal deficits  Psychiatric: Normal mood, normal affect  : No CVA tenderness. No pressley.   Skin: No rashes      LABS:      134<L>  |  101  |  103<H>  ----------------------------<  92  5.1   |  15<L>  |  13.09<H>    Ca    9.4      06 Mar 2020 06:30  Phos  8.3     03-06  Mg     2.3         TPro  7.0  /  Alb  3.9  /  TBili  0.3  /  DBili      /  AST  12  /  ALT  5   /  AlkPhos  65  -    Creatinine Trend: 13.09 <--, 14.04 <--                        8.6    5.18  )-----------( 255      ( 06 Mar 2020 06:30 )             26.0     Urine Studies:  Urinalysis Basic - ( 06 Mar 2020 06:30 )    Color: COLORLESS / Appearance: CLEAR / S.008 / pH: 6.5  Gluc: 30 / Ketone: NEGATIVE  / Bili: NEGATIVE / Urobili: NORMAL   Blood: TRACE / Protein: 20 / Nitrite: NEGATIVE   Leuk Esterase: NEGATIVE / RBC: 3-5 / WBC 3-5   Sq Epi:  / Non Sq Epi: FEW / Bacteria:       Creatinine, Random Urine: 36.90 mg/dL ( @ 06:30)  Protein, Random Urine: 11.8 mg/dL ( @ 06:30)      RADIOLOGY & ADDITIONAL STUDIES:

## 2020-03-06 NOTE — PROGRESS NOTE ADULT - SUBJECTIVE AND OBJECTIVE BOX
Patient is a 75y old  Female who presents with a chief complaint of + CINDA, Acute Renal Failure, (05 Mar 2020 23:26)      SUBJECTIVE / OVERNIGHT EVENTS:  feels well though fatigue. tired.   no cp, no sob, no n/v/d. no abdominal pain.  no headache, no dizziness.   sitting up eating lunch.         Vital Signs Last 24 Hrs  T(C): 36.6 (06 Mar 2020 13:30), Max: 37.2 (05 Mar 2020 23:50)  T(F): 97.9 (06 Mar 2020 13:30), Max: 98.9 (05 Mar 2020 23:50)  HR: 82 (06 Mar 2020 13:30) (81 - 99)  BP: 130/72 (06 Mar 2020 13:30) (130/72 - 172/81)  BP(mean): --  RR: 18 (06 Mar 2020 13:30) (16 - 19)  SpO2: 100% (06 Mar 2020 13:30) (100% - 100%)  I&O's Summary    05 Mar 2020 07:01  -  06 Mar 2020 07:00  --------------------------------------------------------  IN: 0 mL / OUT: 400 mL / NET: -400 mL        PHYSICAL EXAM:  GENERAL: NAD, Comfortable  HEAD:  Atraumatic, Normocephalic  EYES: EOMI, PERRLA, conjunctiva and sclera clear  NECK: Supple, No JVD  CHEST/LUNG: Clear to auscultation bilaterally; No wheeze  HEART: Regular rate and rhythm; No murmurs, rubs, or gallops  ABDOMEN: Soft, Nontender, Nondistended; Bowel sounds present  Neuro: AAO x 3, no focal deficit, 5/5 b/l extremities  EXTREMITIES:  2+ Peripheral Pulses, No clubbing, cyanosis, or edema  SKIN: No rashes or lesions    LABS:                        8.6    5.18  )-----------( 255      ( 06 Mar 2020 06:30 )             26.0     03-06    134<L>  |  101  |  103<H>  ----------------------------<  92  5.1   |  15<L>  |  13.09<H>    Ca    9.4      06 Mar 2020 06:30  Phos  8.3     03-06  Mg     2.3     03-06    TPro  7.0  /  Alb  3.9  /  TBili  0.3  /  DBili  x   /  AST  12  /  ALT  5   /  AlkPhos  65  03-06    PT/INR - ( 06 Mar 2020 06:30 )   PT: 10.7 SEC;   INR: 0.93          PTT - ( 06 Mar 2020 06:30 )  PTT:27.2 SEC  CAPILLARY BLOOD GLUCOSE      POCT Blood Glucose.: 110 mg/dL (06 Mar 2020 11:28)  POCT Blood Glucose.: 89 mg/dL (06 Mar 2020 07:28)        Urinalysis Basic - ( 06 Mar 2020 06:30 )    Color: COLORLESS / Appearance: CLEAR / S.008 / pH: 6.5  Gluc: 30 / Ketone: NEGATIVE  / Bili: NEGATIVE / Urobili: NORMAL   Blood: TRACE / Protein: 20 / Nitrite: NEGATIVE   Leuk Esterase: NEGATIVE / RBC: 3-5 / WBC 3-5   Sq Epi: x / Non Sq Epi: FEW / Bacteria: x        RADIOLOGY & ADDITIONAL TESTS:    Imaging Personally Reviewed:  [x] YES  [ ] NO    Consultant(s) Notes Reviewed:  [x] YES  [ ] NO      MEDICATIONS  (STANDING):  budesonide 160 MICROgram(s)/formoterol 4.5 MICROgram(s) Inhaler 2 Puff(s) Inhalation two times a day  dextrose 5%. 1000 milliLiter(s) (50 mL/Hr) IV Continuous <Continuous>  dextrose 50% Injectable 12.5 Gram(s) IV Push once  dextrose 50% Injectable 25 Gram(s) IV Push once  dextrose 50% Injectable 25 Gram(s) IV Push once  heparin  Injectable 5000 Unit(s) SubCutaneous every 8 hours  insulin lispro (HumaLOG) corrective regimen sliding scale   SubCutaneous at bedtime  insulin lispro (HumaLOG) corrective regimen sliding scale   SubCutaneous three times a day before meals  tiotropium 18 MICROgram(s) Capsule 1 Capsule(s) Inhalation daily    MEDICATIONS  (PRN):  dextrose 40% Gel 15 Gram(s) Oral once PRN Blood Glucose LESS THAN 70 milliGRAM(s)/deciliter  glucagon  Injectable 1 milliGRAM(s) IntraMuscular once PRN Glucose LESS THAN 70 milligrams/deciliter  loratadine 10 milliGRAM(s) Oral daily PRN Allergy      Care Discussed with Consultants/Other Providers [x] YES  [ ] NO    HEALTH ISSUES - PROBLEM Dx:  Preventive measure: Preventive measure  HTN (hypertension): HTN (hypertension)  DM (diabetes mellitus), type 2: DM (diabetes mellitus), type 2  Chronic obstructive pulmonary disease (COPD): Chronic obstructive pulmonary disease (COPD)  Leg pain, bilateral: Leg pain, bilateral  Acute renal failure: Acute renal failure  Suspected deep vein thrombosis (DVT)

## 2020-03-06 NOTE — PROGRESS NOTE ADULT - ASSESSMENT
Pt is a 75 year old woman with a PMHx DM type 2 on Metformin,  COPD, Anemia, HTN on no meds, OA,  presenting for evaluation of abnormal blood and urine results. She reports that for the last 2 weeks she has been having weakness, fatigue, nausea, headache, loss of appetite, and abdominal/back pain. Spoke with the on call physician who reported  that her lab results today showed  Cr 14 Urine protein 300+ Hgb 9.1 HCT 28 K 6.2 and she was told to come to this ED.

## 2020-03-06 NOTE — PATIENT PROFILE ADULT - NSPRESCRUSEDDRG_GEN_A_NUR
Documentation clarification is required for compliance, accuracy in coding and billing, claim validation and reporting severity of illness, quality data and risk of mortality.
No

## 2020-03-06 NOTE — CONSULT NOTE ADULT - ASSESSMENT
75 year old woman with a PMHx DM type 2 on Metformin,  COPD, Anemia, HTN on no meds, OA, sent by pcp for acute renal failure    CINDA on ckd  per note scr 1.4 in aug 2019  now admitted [facility] scr 14.04 slightly better today  ? etiology of CINDA  renal us showed b/l small kidney no hydro  UA with mild protein  has symptoms of uremia but stating is feeling better today  check urine cr, na  likely will need kidney biopsy in view of acute worsen of function  avoid AC right now. please call IR for biopsy monday if possible  patient is agreeable  monitor bmp. avoid nephrotoxic agents    Proteinuria  mild however in view of worsen renal function will get vasculitis work up  Vasculitis w/u for proteinuria (check hep b surface ag, hep c antibody, hiv, rpr, c3, c4, leann, anca, spep, serum immunofixation, immuno light chain)     Acidosis  AG   likely sec to uremia  start bicarb 650 tid  monitor    anemia  check iron panel  possible sec to ckd  monitor hb  transfuse if needed    HTN  not on meds  controlled  monitor    CKD-MBD  check pth, vit d 25 and phos level  monitor phos and calcium daily 75 year old woman with a PMHx DM type 2 on Metformin,  COPD, Anemia, HTN on no meds, OA, sent by pcp for acute renal failure    CINDA on ckd  per note scr 1.4 in aug 2019  now admitted [facility] scr 14.04 slightly better today  ? etiology of CINDA  renal us showed b/l small kidney no hydro, b/l fullness of kidney, pending CT abd  UA with mild protein  has symptoms of uremia but stating is feeling better today  check urine cr, na  likely will need kidney biopsy in view of acute worsen of function  avoid AC right now. please call IR for biopsy monday if possible  patient is agreeable  monitor bmp. avoid nephrotoxic agents    Proteinuria  mild however in view of worsen renal function will get vasculitis work up  Vasculitis w/u for proteinuria (check hep b surface ag, hep c antibody, hiv, rpr, c3, c4, leann, anca, spep, serum immunofixation, immuno light chain)     Acidosis  AG   likely sec to uremia  start bicarb 650 tid  monitor    anemia  possible sec to chronic   monitor hb  transfuse if needed    HTN  not on meds  controlled  monitor    CKD-MBD  check pth, vit d 25 and phos level  monitor phos and calcium daily 75 year old woman with a PMHx DM type 2 on Metformin,  COPD, Anemia, HTN on no meds, OA, sent by pcp for acute renal failure    CINDA on ckd  per note scr 1.4 in aug 2019  now admitted [facility] scr 14.04 slightly better today  ? etiology of CINDA  renal us showed b/l small kidney no hydro, b/l fullness of kidney, pending CT abd  UA with mild protein  has symptoms of uremia but stating is feeling better today  check urine cr, na  likely will need kidney biopsy in view of acute worsen of function  avoid AC right now. please call IR for biopsy monday   patient is agreeable  monitor bmp. avoid nephrotoxic agents    Proteinuria  mild however in view of worsen renal function will get vasculitis work up  Vasculitis w/u for proteinuria (check hep b surface ag, hep c antibody, hiv, rpr, c3, c4, leann, anca, spep, serum immunofixation, immuno light chain)     Acidosis  AG   likely sec to uremia  start bicarb 650 tid  monitor    anemia  possible sec to chronic   monitor hb  transfuse if needed    HTN  not on meds  controlled  monitor    CKD-MBD  check pth, vit d 25 and phos level  monitor phos and calcium daily

## 2020-03-06 NOTE — PROGRESS NOTE ADULT - PROBLEM SELECTOR PLAN 1
ARF, + Anemia: Unclear etiology, Renal follow up, R/O Malignancy.   CT A/P No contrast without acute pathology  Folate, SPEP, UPEP, serum and Urine immunofixation, C3, C4, RF, MARGARITA, ANCA, HIV Test, Hep A,B,C profile, LDH, Hgb Electrophoresis, LDH, Uric Acid, Serum PTH intact, Vit D 1-25 Dihydroxy , Vit D 25 Hydroxy, Erythropoietin Level, Urine Creatinine, Retic Count, Serum Haptoglobin,   R/O Multiple Myeloma  Avoid Nephrotoxic agents, Avoid NSAID, Renal Diet, Low K+ diet

## 2020-03-07 LAB
ALBUMIN SERPL ELPH-MCNC: 3.6 G/DL — SIGNIFICANT CHANGE UP (ref 3.3–5)
ALP SERPL-CCNC: 61 U/L — SIGNIFICANT CHANGE UP (ref 40–120)
ALT FLD-CCNC: 9 U/L — SIGNIFICANT CHANGE UP (ref 4–33)
ANA TITR SER: NEGATIVE — SIGNIFICANT CHANGE UP
ANION GAP SERPL CALC-SCNC: 21 MMO/L — HIGH (ref 7–14)
APTT BLD: 34.1 SEC — SIGNIFICANT CHANGE UP (ref 27.5–36.3)
AST SERPL-CCNC: 14 U/L — SIGNIFICANT CHANGE UP (ref 4–32)
BASOPHILS # BLD AUTO: 0.04 K/UL — SIGNIFICANT CHANGE UP (ref 0–0.2)
BASOPHILS NFR BLD AUTO: 1 % — SIGNIFICANT CHANGE UP (ref 0–2)
BILIRUB SERPL-MCNC: 0.2 MG/DL — SIGNIFICANT CHANGE UP (ref 0.2–1.2)
BUN SERPL-MCNC: 104 MG/DL — HIGH (ref 7–23)
CALCIUM SERPL-MCNC: 9 MG/DL — SIGNIFICANT CHANGE UP (ref 8.4–10.5)
CHLORIDE SERPL-SCNC: 100 MMOL/L — SIGNIFICANT CHANGE UP (ref 98–107)
CO2 SERPL-SCNC: 14 MMOL/L — LOW (ref 22–31)
CREAT SERPL-MCNC: 13.17 MG/DL — HIGH (ref 0.5–1.3)
EOSINOPHIL # BLD AUTO: 0.2 K/UL — SIGNIFICANT CHANGE UP (ref 0–0.5)
EOSINOPHIL NFR BLD AUTO: 5 % — SIGNIFICANT CHANGE UP (ref 0–6)
GLUCOSE SERPL-MCNC: 96 MG/DL — SIGNIFICANT CHANGE UP (ref 70–99)
HCT VFR BLD CALC: 23.9 % — LOW (ref 34.5–45)
HGB BLD-MCNC: 7.8 G/DL — LOW (ref 11.5–15.5)
IMM GRANULOCYTES NFR BLD AUTO: 0.2 % — SIGNIFICANT CHANGE UP (ref 0–1.5)
INR BLD: 0.92 — SIGNIFICANT CHANGE UP (ref 0.88–1.17)
LYMPHOCYTES # BLD AUTO: 1.19 K/UL — SIGNIFICANT CHANGE UP (ref 1–3.3)
LYMPHOCYTES # BLD AUTO: 29.7 % — SIGNIFICANT CHANGE UP (ref 13–44)
MAGNESIUM SERPL-MCNC: 2.3 MG/DL — SIGNIFICANT CHANGE UP (ref 1.6–2.6)
MCHC RBC-ENTMCNC: 29.8 PG — SIGNIFICANT CHANGE UP (ref 27–34)
MCHC RBC-ENTMCNC: 32.6 % — SIGNIFICANT CHANGE UP (ref 32–36)
MCV RBC AUTO: 91.2 FL — SIGNIFICANT CHANGE UP (ref 80–100)
MONOCYTES # BLD AUTO: 0.41 K/UL — SIGNIFICANT CHANGE UP (ref 0–0.9)
MONOCYTES NFR BLD AUTO: 10.2 % — SIGNIFICANT CHANGE UP (ref 2–14)
NEUTROPHILS # BLD AUTO: 2.16 K/UL — SIGNIFICANT CHANGE UP (ref 1.8–7.4)
NEUTROPHILS NFR BLD AUTO: 53.9 % — SIGNIFICANT CHANGE UP (ref 43–77)
NRBC # FLD: 0 K/UL — SIGNIFICANT CHANGE UP (ref 0–0)
PHOSPHATE SERPL-MCNC: 8.6 MG/DL — HIGH (ref 2.5–4.5)
PLATELET # BLD AUTO: 233 K/UL — SIGNIFICANT CHANGE UP (ref 150–400)
PMV BLD: 10.2 FL — SIGNIFICANT CHANGE UP (ref 7–13)
POTASSIUM SERPL-MCNC: 4.6 MMOL/L — SIGNIFICANT CHANGE UP (ref 3.5–5.3)
POTASSIUM SERPL-SCNC: 4.6 MMOL/L — SIGNIFICANT CHANGE UP (ref 3.5–5.3)
PROT SERPL-MCNC: 6.7 G/DL — SIGNIFICANT CHANGE UP (ref 6–8.3)
PROTHROM AB SERPL-ACNC: 10.6 SEC — SIGNIFICANT CHANGE UP (ref 9.8–13.1)
RBC # BLD: 2.62 M/UL — LOW (ref 3.8–5.2)
RBC # FLD: 13 % — SIGNIFICANT CHANGE UP (ref 10.3–14.5)
SODIUM SERPL-SCNC: 135 MMOL/L — SIGNIFICANT CHANGE UP (ref 135–145)
WBC # BLD: 4.01 K/UL — SIGNIFICANT CHANGE UP (ref 3.8–10.5)
WBC # FLD AUTO: 4.01 K/UL — SIGNIFICANT CHANGE UP (ref 3.8–10.5)

## 2020-03-07 RX ORDER — SENNA PLUS 8.6 MG/1
2 TABLET ORAL AT BEDTIME
Refills: 0 | Status: DISCONTINUED | OUTPATIENT
Start: 2020-03-07 | End: 2020-03-16

## 2020-03-07 RX ORDER — SEVELAMER CARBONATE 2400 MG/1
1600 POWDER, FOR SUSPENSION ORAL THREE TIMES A DAY
Refills: 0 | Status: DISCONTINUED | OUTPATIENT
Start: 2020-03-07 | End: 2020-03-07

## 2020-03-07 RX ORDER — ONDANSETRON 8 MG/1
4 TABLET, FILM COATED ORAL ONCE
Refills: 0 | Status: COMPLETED | OUTPATIENT
Start: 2020-03-07 | End: 2020-03-07

## 2020-03-07 RX ORDER — SODIUM BICARBONATE 1 MEQ/ML
1300 SYRINGE (ML) INTRAVENOUS THREE TIMES A DAY
Refills: 0 | Status: DISCONTINUED | OUTPATIENT
Start: 2020-03-07 | End: 2020-03-14

## 2020-03-07 RX ORDER — SEVELAMER CARBONATE 2400 MG/1
1600 POWDER, FOR SUSPENSION ORAL
Refills: 0 | Status: DISCONTINUED | OUTPATIENT
Start: 2020-03-07 | End: 2020-03-16

## 2020-03-07 RX ADMIN — LORATADINE 10 MILLIGRAM(S): 10 TABLET ORAL at 23:52

## 2020-03-07 RX ADMIN — BUDESONIDE AND FORMOTEROL FUMARATE DIHYDRATE 2 PUFF(S): 160; 4.5 AEROSOL RESPIRATORY (INHALATION) at 11:35

## 2020-03-07 RX ADMIN — TIOTROPIUM BROMIDE 1 CAPSULE(S): 18 CAPSULE ORAL; RESPIRATORY (INHALATION) at 16:10

## 2020-03-07 RX ADMIN — Medication 650 MILLIGRAM(S): at 06:23

## 2020-03-07 RX ADMIN — Medication 0: at 11:34

## 2020-03-07 RX ADMIN — Medication 1300 MILLIGRAM(S): at 23:52

## 2020-03-07 RX ADMIN — SENNA PLUS 2 TABLET(S): 8.6 TABLET ORAL at 23:51

## 2020-03-07 RX ADMIN — SEVELAMER CARBONATE 1600 MILLIGRAM(S): 2400 POWDER, FOR SUSPENSION ORAL at 16:09

## 2020-03-07 RX ADMIN — Medication 1300 MILLIGRAM(S): at 16:09

## 2020-03-07 RX ADMIN — HEPARIN SODIUM 5000 UNIT(S): 5000 INJECTION INTRAVENOUS; SUBCUTANEOUS at 06:23

## 2020-03-07 RX ADMIN — ONDANSETRON 4 MILLIGRAM(S): 8 TABLET, FILM COATED ORAL at 22:41

## 2020-03-07 RX ADMIN — HEPARIN SODIUM 5000 UNIT(S): 5000 INJECTION INTRAVENOUS; SUBCUTANEOUS at 23:53

## 2020-03-07 RX ADMIN — HEPARIN SODIUM 5000 UNIT(S): 5000 INJECTION INTRAVENOUS; SUBCUTANEOUS at 13:43

## 2020-03-07 RX ADMIN — Medication 0: at 17:29

## 2020-03-07 NOTE — PROGRESS NOTE ADULT - SUBJECTIVE AND OBJECTIVE BOX
Patient is a 75y old  Female who presents with a chief complaint of + CINDA, Acute Renal Failure, (07 Mar 2020 10:15)      SUBJECTIVE / OVERNIGHT EVENTS:  stable overall.  awake alert. comfortable.  denied pain.  no n/v/d.   tolerating diet.   likely renal bx on Monday by IR.       Vital Signs Last 24 Hrs  T(C): 36.4 (07 Mar 2020 10:00), Max: 37.1 (06 Mar 2020 18:08)  T(F): 97.6 (07 Mar 2020 10:00), Max: 98.7 (06 Mar 2020 18:08)  HR: 82 (07 Mar 2020 10:00) (82 - 98)  BP: 141/69 (07 Mar 2020 10:00) (130/72 - 148/79)  BP(mean): --  RR: 15 (07 Mar 2020 10:00) (15 - 18)  SpO2: 100% (07 Mar 2020 10:00) (100% - 100%)  I&O's Summary      PHYSICAL EXAM:  GENERAL: NAD, Comfortable  HEAD:  Atraumatic, Normocephalic  EYES: EOMI, PERRLA, conjunctiva and sclera clear  NECK: Supple, No JVD  CHEST/LUNG: Clear to auscultation bilaterally; No wheeze  HEART: Regular rate and rhythm; No murmurs, rubs, or gallops  ABDOMEN: Soft, Nontender, Nondistended; Bowel sounds present  Neuro: AAO x 3, no focal deficit, 5/5 b/l extremities  EXTREMITIES:  2+ Peripheral Pulses, No clubbing, cyanosis, or edema  SKIN: No rashes or lesions      LABS:                        7.8    4.01  )-----------( 233      ( 07 Mar 2020 05:19 )             23.9     03-07    135  |  100  |  104<H>  ----------------------------<  96  4.6   |  14<L>  |  13.17<H>    Ca    9.0      07 Mar 2020 05:19  Phos  8.6     03-07  Mg     2.3     03-07    TPro  6.7  /  Alb  3.6  /  TBili  0.2  /  DBili  x   /  AST  14  /  ALT  9   /  AlkPhos  61  03-07    PT/INR - ( 07 Mar 2020 05:19 )   PT: 10.6 SEC;   INR: 0.92          PTT - ( 07 Mar 2020 05:19 )  PTT:34.1 SEC  CAPILLARY BLOOD GLUCOSE      POCT Blood Glucose.: 248 mg/dL (07 Mar 2020 11:30)  POCT Blood Glucose.: 84 mg/dL (07 Mar 2020 07:43)  POCT Blood Glucose.: 239 mg/dL (06 Mar 2020 22:46)  POCT Blood Glucose.: 125 mg/dL (06 Mar 2020 16:54)        Urinalysis Basic - ( 06 Mar 2020 06:30 )    Color: COLORLESS / Appearance: CLEAR / S.008 / pH: 6.5  Gluc: 30 / Ketone: NEGATIVE  / Bili: NEGATIVE / Urobili: NORMAL   Blood: TRACE / Protein: 20 / Nitrite: NEGATIVE   Leuk Esterase: NEGATIVE / RBC: 3-5 / WBC 3-5   Sq Epi: x / Non Sq Epi: FEW / Bacteria: x        RADIOLOGY & ADDITIONAL TESTS:    Imaging Personally Reviewed:  [x] YES  [ ] NO    Consultant(s) Notes Reviewed:  [x] YES  [ ] NO      MEDICATIONS  (STANDING):  budesonide 160 MICROgram(s)/formoterol 4.5 MICROgram(s) Inhaler 2 Puff(s) Inhalation two times a day  dextrose 5%. 1000 milliLiter(s) (50 mL/Hr) IV Continuous <Continuous>  dextrose 50% Injectable 12.5 Gram(s) IV Push once  dextrose 50% Injectable 25 Gram(s) IV Push once  dextrose 50% Injectable 25 Gram(s) IV Push once  heparin  Injectable 5000 Unit(s) SubCutaneous every 8 hours  insulin lispro (HumaLOG) corrective regimen sliding scale   SubCutaneous at bedtime  insulin lispro (HumaLOG) corrective regimen sliding scale   SubCutaneous three times a day before meals  sevelamer carbonate 1600 milliGRAM(s) Oral three times a day  sodium bicarbonate 1300 milliGRAM(s) Oral three times a day  tiotropium 18 MICROgram(s) Capsule 1 Capsule(s) Inhalation daily    MEDICATIONS  (PRN):  dextrose 40% Gel 15 Gram(s) Oral once PRN Blood Glucose LESS THAN 70 milliGRAM(s)/deciliter  glucagon  Injectable 1 milliGRAM(s) IntraMuscular once PRN Glucose LESS THAN 70 milligrams/deciliter  loratadine 10 milliGRAM(s) Oral daily PRN Allergy      Care Discussed with Consultants/Other Providers [x] YES  [ ] NO    HEALTH ISSUES - PROBLEM Dx:  Preventive measure: Preventive measure  HTN (hypertension): HTN (hypertension)  DM (diabetes mellitus), type 2: DM (diabetes mellitus), type 2  Chronic obstructive pulmonary disease (COPD): Chronic obstructive pulmonary disease (COPD)  Leg pain, bilateral: Leg pain, bilateral  Acute renal failure: Acute renal failure  Suspected deep vein thrombosis (DVT)

## 2020-03-07 NOTE — PROGRESS NOTE ADULT - ASSESSMENT
75 year old woman with a PMHx DM type 2 on Metformin,  COPD, Anemia, HTN on no meds, OA,  presenting for evaluation of abnormal blood and urine results. She reports that for the last 2 weeks she has been having weakness, fatigue, nausea, headache, loss of appetite, and abdominal/back pain. Spoke with the on call physician who reported  that her lab results today showed  Cr 14 Urine protein 300+ Hgb 9.1 HCT 28 K 6.2 and she was told to come to this ED.

## 2020-03-07 NOTE — PROGRESS NOTE ADULT - ASSESSMENT
75 year old woman with a PMHx DM type 2 on Metformin,  COPD, Anemia, HTN on no meds, OA, sent by pcp for acute renal failure    CINDA on ckd  per note scr 1.4 in aug 2019  now admitted [facility] scr 14.04   ? etiology of CINDA  renal us showed b/l small kidney no hydro, b/l fullness of kidney,   Ct A/P with no hydroneprosis  UA with mild protein  has symptoms of uremia   likely will need kidney biopsy in view of acute worsen of function  avoid AC right now. please call IR for biopsy monday   patient is agreeable  monitor bmp. avoid nephrotoxic agents    Proteinuria  mild however in view of worsen renal function will get vasculitis work up  Vasculitis w/u for proteinuria  hep B and Hep C neg, C3, C4 not low   Pending  (, hiv, rpr,  leann, anca, spep, serum immunofixation, immuno light chain)     Acidosis  AG   likely sec to uremia  Increase sodium bicarb to 1300mg TID   monitor serum Co2    anemia  possible sec to chronic   monitor hb  transfuse if needed    HTN  not on meds  controlled  monitor    CKD-MBD  check pth,  Hyperphosphatemia: start binders TID, low PO4 diet   monitor phos and calcium daily

## 2020-03-07 NOTE — PROVIDER CONTACT NOTE (OTHER) - ACTION/TREATMENT ORDERED:
Provider will check QTC on ECG and provide orders for zofran if appropriate.
no action/treatment ordered.

## 2020-03-07 NOTE — PROGRESS NOTE ADULT - PROBLEM SELECTOR PLAN 1
ARF, + Anemia: Unclear etiology, Renal follow up, R/O Malignancy.   CT A/P No contrast without acute pathology  Folate, SPEP, UPEP, serum and Urine immunofixation, C3, C4, RF, MARGARITA, ANCA, HIV Test, Hep A,B,C profile, LDH, Hgb Electrophoresis, LDH, Uric Acid, Serum PTH intact, Vit D 1-25 Dihydroxy , Vit D 25 Hydroxy, Erythropoietin Level, Urine Creatinine, Retic Count, Serum Haptoglobin,   R/O Multiple Myeloma  Avoid Nephrotoxic agents, Avoid NSAID, Renal Diet, Low K+ diet  IR guided renal biopsy on Monday

## 2020-03-08 LAB
ALBUMIN SERPL ELPH-MCNC: 3.5 G/DL — SIGNIFICANT CHANGE UP (ref 3.3–5)
ALP SERPL-CCNC: 58 U/L — SIGNIFICANT CHANGE UP (ref 40–120)
ALT FLD-CCNC: 9 U/L — SIGNIFICANT CHANGE UP (ref 4–33)
ANION GAP SERPL CALC-SCNC: 18 MMO/L — HIGH (ref 7–14)
APTT BLD: 33.3 SEC — SIGNIFICANT CHANGE UP (ref 27.5–36.3)
AST SERPL-CCNC: 14 U/L — SIGNIFICANT CHANGE UP (ref 4–32)
B PERT DNA SPEC QL NAA+PROBE: NOT DETECTED — SIGNIFICANT CHANGE UP
BASOPHILS # BLD AUTO: 0.03 K/UL — SIGNIFICANT CHANGE UP (ref 0–0.2)
BASOPHILS NFR BLD AUTO: 0.6 % — SIGNIFICANT CHANGE UP (ref 0–2)
BILIRUB SERPL-MCNC: 0.3 MG/DL — SIGNIFICANT CHANGE UP (ref 0.2–1.2)
BUN SERPL-MCNC: 97 MG/DL — HIGH (ref 7–23)
C PNEUM DNA SPEC QL NAA+PROBE: NOT DETECTED — SIGNIFICANT CHANGE UP
CALCIUM SERPL-MCNC: 9 MG/DL — SIGNIFICANT CHANGE UP (ref 8.4–10.5)
CHLORIDE SERPL-SCNC: 99 MMOL/L — SIGNIFICANT CHANGE UP (ref 98–107)
CO2 SERPL-SCNC: 17 MMOL/L — LOW (ref 22–31)
CREAT SERPL-MCNC: 12.71 MG/DL — HIGH (ref 0.5–1.3)
EOSINOPHIL # BLD AUTO: 0.2 K/UL — SIGNIFICANT CHANGE UP (ref 0–0.5)
EOSINOPHIL NFR BLD AUTO: 3.9 % — SIGNIFICANT CHANGE UP (ref 0–6)
FLUAV H1 2009 PAND RNA SPEC QL NAA+PROBE: NOT DETECTED — SIGNIFICANT CHANGE UP
FLUAV H1 RNA SPEC QL NAA+PROBE: NOT DETECTED — SIGNIFICANT CHANGE UP
FLUAV H3 RNA SPEC QL NAA+PROBE: NOT DETECTED — SIGNIFICANT CHANGE UP
FLUAV SUBTYP SPEC NAA+PROBE: NOT DETECTED — SIGNIFICANT CHANGE UP
FLUBV RNA SPEC QL NAA+PROBE: NOT DETECTED — SIGNIFICANT CHANGE UP
GLUCOSE SERPL-MCNC: 113 MG/DL — HIGH (ref 70–99)
HADV DNA SPEC QL NAA+PROBE: NOT DETECTED — SIGNIFICANT CHANGE UP
HCOV PNL SPEC NAA+PROBE: SIGNIFICANT CHANGE UP
HCT VFR BLD CALC: 22.7 % — LOW (ref 34.5–45)
HCV AB S/CO SERPL IA: 0.1 S/CO — SIGNIFICANT CHANGE UP (ref 0–0.99)
HCV AB SERPL-IMP: SIGNIFICANT CHANGE UP
HGB BLD-MCNC: 7.7 G/DL — LOW (ref 11.5–15.5)
HIV 1+2 AB+HIV1 P24 AG SERPL QL IA: SIGNIFICANT CHANGE UP
HMPV RNA SPEC QL NAA+PROBE: NOT DETECTED — SIGNIFICANT CHANGE UP
HPIV1 RNA SPEC QL NAA+PROBE: NOT DETECTED — SIGNIFICANT CHANGE UP
HPIV2 RNA SPEC QL NAA+PROBE: NOT DETECTED — SIGNIFICANT CHANGE UP
HPIV3 RNA SPEC QL NAA+PROBE: NOT DETECTED — SIGNIFICANT CHANGE UP
HPIV4 RNA SPEC QL NAA+PROBE: NOT DETECTED — SIGNIFICANT CHANGE UP
IMM GRANULOCYTES NFR BLD AUTO: 0.4 % — SIGNIFICANT CHANGE UP (ref 0–1.5)
INR BLD: 0.95 — SIGNIFICANT CHANGE UP (ref 0.88–1.17)
LYMPHOCYTES # BLD AUTO: 1.47 K/UL — SIGNIFICANT CHANGE UP (ref 1–3.3)
LYMPHOCYTES # BLD AUTO: 28.6 % — SIGNIFICANT CHANGE UP (ref 13–44)
MAGNESIUM SERPL-MCNC: 2.3 MG/DL — SIGNIFICANT CHANGE UP (ref 1.6–2.6)
MCHC RBC-ENTMCNC: 30.4 PG — SIGNIFICANT CHANGE UP (ref 27–34)
MCHC RBC-ENTMCNC: 33.9 % — SIGNIFICANT CHANGE UP (ref 32–36)
MCV RBC AUTO: 89.7 FL — SIGNIFICANT CHANGE UP (ref 80–100)
MONOCYTES # BLD AUTO: 0.48 K/UL — SIGNIFICANT CHANGE UP (ref 0–0.9)
MONOCYTES NFR BLD AUTO: 9.3 % — SIGNIFICANT CHANGE UP (ref 2–14)
NEUTROPHILS # BLD AUTO: 2.94 K/UL — SIGNIFICANT CHANGE UP (ref 1.8–7.4)
NEUTROPHILS NFR BLD AUTO: 57.2 % — SIGNIFICANT CHANGE UP (ref 43–77)
NRBC # FLD: 0 K/UL — SIGNIFICANT CHANGE UP (ref 0–0)
PHOSPHATE SERPL-MCNC: 7.7 MG/DL — HIGH (ref 2.5–4.5)
PLATELET # BLD AUTO: 219 K/UL — SIGNIFICANT CHANGE UP (ref 150–400)
PMV BLD: 10.7 FL — SIGNIFICANT CHANGE UP (ref 7–13)
POTASSIUM SERPL-MCNC: 4.6 MMOL/L — SIGNIFICANT CHANGE UP (ref 3.5–5.3)
POTASSIUM SERPL-SCNC: 4.6 MMOL/L — SIGNIFICANT CHANGE UP (ref 3.5–5.3)
PROT SERPL-MCNC: 6.3 G/DL — SIGNIFICANT CHANGE UP (ref 6–8.3)
PROTHROM AB SERPL-ACNC: 10.8 SEC — SIGNIFICANT CHANGE UP (ref 9.8–13.1)
RBC # BLD: 2.53 M/UL — LOW (ref 3.8–5.2)
RBC # FLD: 12.9 % — SIGNIFICANT CHANGE UP (ref 10.3–14.5)
RSV RNA SPEC QL NAA+PROBE: NOT DETECTED — SIGNIFICANT CHANGE UP
RV+EV RNA SPEC QL NAA+PROBE: NOT DETECTED — SIGNIFICANT CHANGE UP
SODIUM SERPL-SCNC: 134 MMOL/L — LOW (ref 135–145)
WBC # BLD: 5.14 K/UL — SIGNIFICANT CHANGE UP (ref 3.8–10.5)
WBC # FLD AUTO: 5.14 K/UL — SIGNIFICANT CHANGE UP (ref 3.8–10.5)

## 2020-03-08 RX ORDER — AMLODIPINE BESYLATE 2.5 MG/1
2.5 TABLET ORAL DAILY
Refills: 0 | Status: DISCONTINUED | OUTPATIENT
Start: 2020-03-08 | End: 2020-03-16

## 2020-03-08 RX ORDER — INFLUENZA VIRUS VACCINE 15; 15; 15; 15 UG/.5ML; UG/.5ML; UG/.5ML; UG/.5ML
0.5 SUSPENSION INTRAMUSCULAR ONCE
Refills: 0 | Status: DISCONTINUED | OUTPATIENT
Start: 2020-03-08 | End: 2020-03-16

## 2020-03-08 RX ADMIN — BUDESONIDE AND FORMOTEROL FUMARATE DIHYDRATE 2 PUFF(S): 160; 4.5 AEROSOL RESPIRATORY (INHALATION) at 09:01

## 2020-03-08 RX ADMIN — LORATADINE 10 MILLIGRAM(S): 10 TABLET ORAL at 11:51

## 2020-03-08 RX ADMIN — SEVELAMER CARBONATE 1600 MILLIGRAM(S): 2400 POWDER, FOR SUSPENSION ORAL at 07:31

## 2020-03-08 RX ADMIN — SEVELAMER CARBONATE 1600 MILLIGRAM(S): 2400 POWDER, FOR SUSPENSION ORAL at 11:51

## 2020-03-08 RX ADMIN — HEPARIN SODIUM 5000 UNIT(S): 5000 INJECTION INTRAVENOUS; SUBCUTANEOUS at 21:53

## 2020-03-08 RX ADMIN — Medication 1300 MILLIGRAM(S): at 17:51

## 2020-03-08 RX ADMIN — SENNA PLUS 2 TABLET(S): 8.6 TABLET ORAL at 21:53

## 2020-03-08 RX ADMIN — Medication 1300 MILLIGRAM(S): at 21:53

## 2020-03-08 RX ADMIN — LORATADINE 10 MILLIGRAM(S): 10 TABLET ORAL at 23:09

## 2020-03-08 RX ADMIN — HEPARIN SODIUM 5000 UNIT(S): 5000 INJECTION INTRAVENOUS; SUBCUTANEOUS at 11:50

## 2020-03-08 RX ADMIN — TIOTROPIUM BROMIDE 1 CAPSULE(S): 18 CAPSULE ORAL; RESPIRATORY (INHALATION) at 09:01

## 2020-03-08 RX ADMIN — SEVELAMER CARBONATE 1600 MILLIGRAM(S): 2400 POWDER, FOR SUSPENSION ORAL at 17:50

## 2020-03-08 RX ADMIN — Medication 1300 MILLIGRAM(S): at 07:31

## 2020-03-08 RX ADMIN — HEPARIN SODIUM 5000 UNIT(S): 5000 INJECTION INTRAVENOUS; SUBCUTANEOUS at 07:31

## 2020-03-08 NOTE — PROGRESS NOTE ADULT - SUBJECTIVE AND OBJECTIVE BOX
Patient is a 75y old  Female who presents with a chief complaint of + CINDA, Acute Renal Failure, (07 Mar 2020 12:50)      SUBJECTIVE / OVERNIGHT EVENTS:  feels well  Cr stable  +voiding  no cp, no sob, no n/v/d. no abdominal pain.  no headache, no dizziness.         Vital Signs Last 24 Hrs  T(C): 36.6 (08 Mar 2020 09:04), Max: 36.8 (07 Mar 2020 21:05)  T(F): 97.8 (08 Mar 2020 09:04), Max: 98.2 (07 Mar 2020 21:05)  HR: 90 (08 Mar 2020 09:04) (79 - 90)  BP: 144/76 (08 Mar 2020 09:04) (125/70 - 154/79)  BP(mean): --  RR: 17 (08 Mar 2020 09:04) (16 - 18)  SpO2: 100% (08 Mar 2020 09:04) (100% - 100%)  I&O's Summary      PHYSICAL EXAM:  GENERAL: NAD, Comfortable  HEAD:  Atraumatic, Normocephalic  EYES: EOMI, PERRLA, conjunctiva and sclera clear  NECK: Supple, No JVD  CHEST/LUNG: Clear to auscultation bilaterally; No wheeze  HEART: Regular rate and rhythm; No murmurs, rubs, or gallops  ABDOMEN: Soft, Nontender, Nondistended; Bowel sounds present  Neuro: AAO x 3, no focal deficit, 5/5 b/l extremities  EXTREMITIES:  2+ Peripheral Pulses, No clubbing, cyanosis, trace edema  SKIN: No rashes or lesions      LABS:                        7.7    5.14  )-----------( 219      ( 08 Mar 2020 05:35 )             22.7     03-08    134<L>  |  99  |  97<H>  ----------------------------<  113<H>  4.6   |  17<L>  |  12.71<H>    Ca    9.0      08 Mar 2020 05:35  Phos  7.7     03-08  Mg     2.3     03-08    TPro  6.3  /  Alb  3.5  /  TBili  0.3  /  DBili  x   /  AST  14  /  ALT  9   /  AlkPhos  58  03-08    PT/INR - ( 08 Mar 2020 05:35 )   PT: 10.8 SEC;   INR: 0.95          PTT - ( 08 Mar 2020 05:35 )  PTT:33.3 SEC  CAPILLARY BLOOD GLUCOSE      POCT Blood Glucose.: 248 mg/dL (08 Mar 2020 11:08)  POCT Blood Glucose.: 89 mg/dL (08 Mar 2020 07:30)  POCT Blood Glucose.: 90 mg/dL (07 Mar 2020 21:33)  POCT Blood Glucose.: 181 mg/dL (07 Mar 2020 16:47)            RADIOLOGY & ADDITIONAL TESTS:    Imaging Personally Reviewed:  [x] YES  [ ] NO    Consultant(s) Notes Reviewed:  [x] YES  [ ] NO      MEDICATIONS  (STANDING):  budesonide 160 MICROgram(s)/formoterol 4.5 MICROgram(s) Inhaler 2 Puff(s) Inhalation two times a day  dextrose 5%. 1000 milliLiter(s) (50 mL/Hr) IV Continuous <Continuous>  dextrose 50% Injectable 12.5 Gram(s) IV Push once  dextrose 50% Injectable 25 Gram(s) IV Push once  dextrose 50% Injectable 25 Gram(s) IV Push once  heparin  Injectable 5000 Unit(s) SubCutaneous every 8 hours  insulin lispro (HumaLOG) corrective regimen sliding scale   SubCutaneous at bedtime  insulin lispro (HumaLOG) corrective regimen sliding scale   SubCutaneous three times a day before meals  senna 2 Tablet(s) Oral at bedtime  sevelamer carbonate 1600 milliGRAM(s) Oral three times a day with meals  sodium bicarbonate 1300 milliGRAM(s) Oral three times a day  tiotropium 18 MICROgram(s) Capsule 1 Capsule(s) Inhalation daily    MEDICATIONS  (PRN):  dextrose 40% Gel 15 Gram(s) Oral once PRN Blood Glucose LESS THAN 70 milliGRAM(s)/deciliter  glucagon  Injectable 1 milliGRAM(s) IntraMuscular once PRN Glucose LESS THAN 70 milligrams/deciliter  loratadine 10 milliGRAM(s) Oral daily PRN Allergy      Care Discussed with Consultants/Other Providers [x] YES  [ ] NO    HEALTH ISSUES - PROBLEM Dx:  Preventive measure: Preventive measure  HTN (hypertension): HTN (hypertension)  DM (diabetes mellitus), type 2: DM (diabetes mellitus), type 2  Chronic obstructive pulmonary disease (COPD): Chronic obstructive pulmonary disease (COPD)  Leg pain, bilateral: Leg pain, bilateral  Acute renal failure: Acute renal failure  Suspected deep vein thrombosis (DVT)

## 2020-03-08 NOTE — PROGRESS NOTE ADULT - PROBLEM SELECTOR PLAN 5
BP Stable, though borderline high.   Avoid ACEI/ARB, + ARF   will start low dose Norvasc 2.5 mg qdaily

## 2020-03-08 NOTE — PROGRESS NOTE ADULT - ASSESSMENT
75 year old woman with a PMHx DM type 2 on Metformin,  COPD, Anemia, HTN on no meds, OA, sent by pcp for acute renal failure    CINDA on ckd  per note scr 1.4 in aug 2019  now admitted [facility] scr 14.04   ? etiology of CINDA  renal us showed b/l small kidney no hydro, b/l fullness of kidney,   Ct A/P with no hydroneprosis  UA with mild protein  has symptoms of uremia   likely will need kidney biopsy in view of acute worsen of function  avoid AC right now. please call IR for biopsy monday ( may need pRBC to keep Hb >8)  patient is agreeable  monitor bmp. avoid nephrotoxic agents    Proteinuria  mild however in view of worsen renal function will get vasculitis work up  Vasculitis w/u for proteinuria  hep B and Hep C neg, C3, C4 not low   Pending  (, hiv, rpr,  leann, anca, spep, serum immunofixation, immuno light chain)     Acidosis  AG   likely sec to uremia  continue sodium bicarb to 1300mg TID   monitor serum Co2    anemia  possible sec to chronic   monitor hb  transfuse if needed    HTN  not on meds  controlled  monitor    CKD-MBD  check pth,  Hyperphosphatemia: start binders TID, low PO4 diet   monitor phos and calcium daily

## 2020-03-08 NOTE — PROGRESS NOTE ADULT - SUBJECTIVE AND OBJECTIVE BOX
[facility]   MD CHRIS TOPETE MD RUORU WONG, PA    TEL:  OFFICE: [phone]  DR MENDOZA CELL: [phone]  AVA WELCH CELL: [phone]  DR. MCKEON CELL: [phone]  DR. SANTANA CELL: [phone]    FROM 5 PM - 7 AM PLEASE CALL ANSWERING SERVICE: [phone]    RENAL FOLLOW UP NOTE  --------------------------------------------------------------------------------  HPI:      Pt seen and examined at bedside.   Denies SOB, chest pain     PAST HISTORY  --------------------------------------------------------------------------------  No significant changes to PMH, PSH, FHx, SHx, unless otherwise noted    ALLERGIES & MEDICATIONS  --------------------------------------------------------------------------------  Allergies    No Known Allergies    Intolerances      Standing Inpatient Medications  amLODIPine   Tablet 2.5 milliGRAM(s) Oral daily  budesonide 160 MICROgram(s)/formoterol 4.5 MICROgram(s) Inhaler 2 Puff(s) Inhalation two times a day  dextrose 5%. 1000 milliLiter(s) IV Continuous <Continuous>  dextrose 50% Injectable 12.5 Gram(s) IV Push once  dextrose 50% Injectable 25 Gram(s) IV Push once  dextrose 50% Injectable 25 Gram(s) IV Push once  heparin  Injectable 5000 Unit(s) SubCutaneous every 8 hours  insulin lispro (HumaLOG) corrective regimen sliding scale   SubCutaneous at bedtime  insulin lispro (HumaLOG) corrective regimen sliding scale   SubCutaneous three times a day before meals  senna 2 Tablet(s) Oral at bedtime  sevelamer carbonate 1600 milliGRAM(s) Oral three times a day with meals  sodium bicarbonate 1300 milliGRAM(s) Oral three times a day  tiotropium 18 MICROgram(s) Capsule 1 Capsule(s) Inhalation daily    PRN Inpatient Medications  dextrose 40% Gel 15 Gram(s) Oral once PRN  glucagon  Injectable 1 milliGRAM(s) IntraMuscular once PRN  loratadine 10 milliGRAM(s) Oral daily PRN      REVIEW OF SYSTEMS  --------------------------------------------------------------------------------  General: no fever  CVS: no chest pain  RESP: no sob, no cough  ABD: no abdominal pain, decreased appetite  : no dysuria,  MSK: no edema     VITALS/PHYSICAL EXAM  --------------------------------------------------------------------------------  T(C): 36.6 (03-08-20 @ 09:04), Max: 36.8 (03-07-20 @ 21:05)  HR: 90 (03-08-20 @ 09:04) (79 - 90)  BP: 144/76 (03-08-20 @ 09:04) (125/70 - 154/79)  RR: 17 (03-08-20 @ 09:04) (16 - 18)  SpO2: 100% (03-08-20 @ 09:04) (100% - 100%)  Wt(kg): --        Physical Exam:  	Gen: NAD  	HEENT: MMM  	Pulm: CTA B/L  	CV: S1S2  	Abd: Soft, +BS  	Ext: No LE edema B/L                      Neuro: Awake alert  	Skin: Warm and Dry   	 no huan    LABS/STUDIES  --------------------------------------------------------------------------------              7.7    5.14  >-----------<  219      [03-08-20 @ 05:35]              22.7     134  |  99  |  97  ----------------------------<  113      [03-08-20 @ 05:35]  4.6   |  17  |  12.71        Ca     9.0     [03-08-20 @ 05:35]      Mg     2.3     [03-08-20 @ 05:35]      Phos  7.7     [03-08-20 @ 05:35]    TPro  6.3  /  Alb  3.5  /  TBili  0.3  /  DBili  x   /  AST  14  /  ALT  9   /  AlkPhos  58  [03-08-20 @ 05:35]    PT/INR: PT 10.8 , INR 0.95       [03-08-20 @ 05:35]  PTT: 33.3       [03-08-20 @ 05:35]      Creatinine Trend:  SCr 12.71 [03-08 @ 05:35]  SCr 13.17 [03-07 @ 05:19]  SCr 13.09 [03-06 @ 06:30]  SCr 14.04 [03-05 @ 18:30]    Urinalysis - [03-06-20 @ 06:30]      Color COLORLESS / Appearance CLEAR / SG 1.008 / pH 6.5      Gluc 30 / Ketone NEGATIVE  / Bili NEGATIVE / Urobili NORMAL       Blood TRACE / Protein 20 / Leuk Est NEGATIVE / Nitrite NEGATIVE      RBC 3-5 / WBC 3-5 / Hyaline  / Gran  / Sq Epi  / Non Sq Epi FEW / Bacteria     Urine Creatinine 36.90      [03-06-20 @ 06:30]  Urine Protein 11.8      [03-06-20 @ 06:30]    Iron 107, TIBC 252, %sat --      [03-06-20 @ 06:30]  Ferritin 152.9      [03-06-20 @ 06:30]  .9 (Ca --)      [03-06-20 @ 06:30]   --  HbA1c 6.0      [03-06-20 @ 06:30]  TSH 1.47      [03-06-20 @ 06:30]  Lipid: chol 183, , HDL 35,       [03-06-20 @ 06:30]    HBsAg Nonreactive      [03-06-20 @ 06:30]  HCV 0.10, Nonreactive Hepatitis C AB  S/CO Ratio                        Interpretation  < 1.00                                   Non-Reactive  1.00 - 4.99                         Weakly-Reactive  >= 5.00                                Reactive  Non-Reactive: Aperson with a non-reactive HCV antibody  result is considered uninfected.  No further action is  needed unless recent infection is suspected.  In these  cases, consider repeat testing later to detect  seroconversion..  Weakly-Reactive: HCV antibody test is abnormal, HCV RNA  Qualitative test will follow.  Reactive: HCV antibody test is abnormal, HCV RNA  Qualitative test will follow.  Note: HCV antibody testing is performed on the [employer] system.      [03-07-20 @ 05:19]    MARGARITA: titer Negative, pattern --      [03-06-20 @ 06:30]  C3 Complement 91.1      [03-06-20 @ 06:30]  C4 Complement 34.2      [03-06-20 @ 06:30]

## 2020-03-08 NOTE — PROGRESS NOTE ADULT - PROBLEM SELECTOR PLAN 1
ARF, +Anemia: Unclear etiology, Renal follow up, R/O Malignancy.   CT A/P No contrast without acute pathology  Folate, SPEP, UPEP, serum and Urine immunofixation, C3, C4, RF, MARGARITA, ANCA, HIV Test, Hep A,B,C profile, LDH, Hgb Electrophoresis, LDH, Uric Acid, Serum PTH intact, Vit D 1-25 Dihydroxy , Vit D 25 Hydroxy, Erythropoietin Level, Urine Creatinine, Retic Count, Serum Haptoglobin,   R/O Multiple Myeloma  Avoid Nephrotoxic agents, Avoid NSAID, Renal Diet, Low K+ diet  IR guided renal biopsy on Monday, to check with IR on Monday morning to make sure she has a spot

## 2020-03-08 NOTE — PROGRESS NOTE ADULT - PROBLEM SELECTOR PLAN 6
DVT Prophylaxis Interpolation Flap Text: A decision was made to reconstruct the defect utilizing an interpolation axial flap and a staged reconstruction.  A telfa template was made of the defect.  This telfa template was then used to outline the interpolation flap.  The donor area for the pedicle flap was then injected with anesthesia.  The flap was excised through the skin and subcutaneous tissue down to the layer of the underlying musculature.  The interpolation flap was carefully excised within this deep plane to maintain its blood supply.  The edges of the donor site were undermined.   The donor site was closed in a primary fashion.  The pedicle was then rotated into position and sutured.  Once the tube was sutured into place, adequate blood supply was confirmed with blanching and refill.  The pedicle was then wrapped with xeroform gauze and dressed appropriately with a telfa and gauze bandage to ensure continued blood supply and protect the attached pedicle.

## 2020-03-09 LAB
ALBUMIN SERPL ELPH-MCNC: 3.6 G/DL — SIGNIFICANT CHANGE UP (ref 3.3–5)
ALP SERPL-CCNC: 56 U/L — SIGNIFICANT CHANGE UP (ref 40–120)
ALT FLD-CCNC: 10 U/L — SIGNIFICANT CHANGE UP (ref 4–33)
ANION GAP SERPL CALC-SCNC: 18 MMO/L — HIGH (ref 7–14)
ANION GAP SERPL CALC-SCNC: 18 MMO/L — HIGH (ref 7–14)
APTT BLD: 46.5 SEC — HIGH (ref 27.5–36.3)
AST SERPL-CCNC: 13 U/L — SIGNIFICANT CHANGE UP (ref 4–32)
BASOPHILS # BLD AUTO: 0.05 K/UL — SIGNIFICANT CHANGE UP (ref 0–0.2)
BASOPHILS NFR BLD AUTO: 0.9 % — SIGNIFICANT CHANGE UP (ref 0–2)
BILIRUB SERPL-MCNC: 0.3 MG/DL — SIGNIFICANT CHANGE UP (ref 0.2–1.2)
BLD GP AB SCN SERPL QL: NEGATIVE — SIGNIFICANT CHANGE UP
BUN SERPL-MCNC: 94 MG/DL — HIGH (ref 7–23)
BUN SERPL-MCNC: 94 MG/DL — HIGH (ref 7–23)
CALCIUM SERPL-MCNC: 9 MG/DL — SIGNIFICANT CHANGE UP (ref 8.4–10.5)
CALCIUM SERPL-MCNC: 9 MG/DL — SIGNIFICANT CHANGE UP (ref 8.4–10.5)
CHLORIDE SERPL-SCNC: 97 MMOL/L — LOW (ref 98–107)
CHLORIDE SERPL-SCNC: 97 MMOL/L — LOW (ref 98–107)
CO2 SERPL-SCNC: 18 MMOL/L — LOW (ref 22–31)
CO2 SERPL-SCNC: 18 MMOL/L — LOW (ref 22–31)
CREAT SERPL-MCNC: 13.41 MG/DL — HIGH (ref 0.5–1.3)
CREAT SERPL-MCNC: 13.41 MG/DL — HIGH (ref 0.5–1.3)
EOSINOPHIL # BLD AUTO: 0.28 K/UL — SIGNIFICANT CHANGE UP (ref 0–0.5)
EOSINOPHIL NFR BLD AUTO: 5.2 % — SIGNIFICANT CHANGE UP (ref 0–6)
GLUCOSE SERPL-MCNC: 96 MG/DL — SIGNIFICANT CHANGE UP (ref 70–99)
GLUCOSE SERPL-MCNC: 96 MG/DL — SIGNIFICANT CHANGE UP (ref 70–99)
HCT VFR BLD CALC: 23 % — LOW (ref 34.5–45)
HCT VFR BLD CALC: 23 % — LOW (ref 34.5–45)
HGB BLD-MCNC: 7.7 G/DL — LOW (ref 11.5–15.5)
HGB BLD-MCNC: 7.7 G/DL — LOW (ref 11.5–15.5)
IMM GRANULOCYTES NFR BLD AUTO: 0.2 % — SIGNIFICANT CHANGE UP (ref 0–1.5)
INR BLD: 0.96 — SIGNIFICANT CHANGE UP (ref 0.88–1.17)
LYMPHOCYTES # BLD AUTO: 1.71 K/UL — SIGNIFICANT CHANGE UP (ref 1–3.3)
LYMPHOCYTES # BLD AUTO: 31.7 % — SIGNIFICANT CHANGE UP (ref 13–44)
MAGNESIUM SERPL-MCNC: 2.1 MG/DL — SIGNIFICANT CHANGE UP (ref 1.6–2.6)
MCHC RBC-ENTMCNC: 30.1 PG — SIGNIFICANT CHANGE UP (ref 27–34)
MCHC RBC-ENTMCNC: 30.1 PG — SIGNIFICANT CHANGE UP (ref 27–34)
MCHC RBC-ENTMCNC: 33.5 % — SIGNIFICANT CHANGE UP (ref 32–36)
MCHC RBC-ENTMCNC: 33.5 % — SIGNIFICANT CHANGE UP (ref 32–36)
MCV RBC AUTO: 89.8 FL — SIGNIFICANT CHANGE UP (ref 80–100)
MCV RBC AUTO: 89.8 FL — SIGNIFICANT CHANGE UP (ref 80–100)
MONOCYTES # BLD AUTO: 0.52 K/UL — SIGNIFICANT CHANGE UP (ref 0–0.9)
MONOCYTES NFR BLD AUTO: 9.6 % — SIGNIFICANT CHANGE UP (ref 2–14)
NEUTROPHILS # BLD AUTO: 2.82 K/UL — SIGNIFICANT CHANGE UP (ref 1.8–7.4)
NEUTROPHILS NFR BLD AUTO: 52.4 % — SIGNIFICANT CHANGE UP (ref 43–77)
NRBC # FLD: 0 K/UL — SIGNIFICANT CHANGE UP (ref 0–0)
NRBC # FLD: 0 K/UL — SIGNIFICANT CHANGE UP (ref 0–0)
PHOSPHATE SERPL-MCNC: 7 MG/DL — HIGH (ref 2.5–4.5)
PLATELET # BLD AUTO: 201 K/UL — SIGNIFICANT CHANGE UP (ref 150–400)
PLATELET # BLD AUTO: 201 K/UL — SIGNIFICANT CHANGE UP (ref 150–400)
PMV BLD: 9.9 FL — SIGNIFICANT CHANGE UP (ref 7–13)
PMV BLD: 9.9 FL — SIGNIFICANT CHANGE UP (ref 7–13)
POTASSIUM SERPL-MCNC: 4.3 MMOL/L — SIGNIFICANT CHANGE UP (ref 3.5–5.3)
POTASSIUM SERPL-MCNC: 4.3 MMOL/L — SIGNIFICANT CHANGE UP (ref 3.5–5.3)
POTASSIUM SERPL-SCNC: 4.3 MMOL/L — SIGNIFICANT CHANGE UP (ref 3.5–5.3)
POTASSIUM SERPL-SCNC: 4.3 MMOL/L — SIGNIFICANT CHANGE UP (ref 3.5–5.3)
PROT SERPL-MCNC: 6.4 G/DL — SIGNIFICANT CHANGE UP (ref 6–8.3)
PROTHROM AB SERPL-ACNC: 11 SEC — SIGNIFICANT CHANGE UP (ref 9.8–13.1)
RBC # BLD: 2.56 M/UL — LOW (ref 3.8–5.2)
RBC # BLD: 2.56 M/UL — LOW (ref 3.8–5.2)
RBC # FLD: 13 % — SIGNIFICANT CHANGE UP (ref 10.3–14.5)
RBC # FLD: 13 % — SIGNIFICANT CHANGE UP (ref 10.3–14.5)
RH IG SCN BLD-IMP: POSITIVE — SIGNIFICANT CHANGE UP
SODIUM SERPL-SCNC: 133 MMOL/L — LOW (ref 135–145)
SODIUM SERPL-SCNC: 133 MMOL/L — LOW (ref 135–145)
WBC # BLD: 5.39 K/UL — SIGNIFICANT CHANGE UP (ref 3.8–10.5)
WBC # BLD: 5.39 K/UL — SIGNIFICANT CHANGE UP (ref 3.8–10.5)
WBC # FLD AUTO: 5.39 K/UL — SIGNIFICANT CHANGE UP (ref 3.8–10.5)
WBC # FLD AUTO: 5.39 K/UL — SIGNIFICANT CHANGE UP (ref 3.8–10.5)

## 2020-03-09 PROCEDURE — 93306 TTE W/DOPPLER COMPLETE: CPT | Mod: 26

## 2020-03-09 PROCEDURE — 99222 1ST HOSP IP/OBS MODERATE 55: CPT

## 2020-03-09 RX ORDER — HEPARIN SODIUM 5000 [USP'U]/ML
5000 INJECTION INTRAVENOUS; SUBCUTANEOUS EVERY 8 HOURS
Refills: 0 | Status: COMPLETED | OUTPATIENT
Start: 2020-03-09 | End: 2020-03-09

## 2020-03-09 RX ORDER — ERYTHROPOIETIN 10000 [IU]/ML
10000 INJECTION, SOLUTION INTRAVENOUS; SUBCUTANEOUS
Refills: 0 | Status: DISCONTINUED | OUTPATIENT
Start: 2020-03-09 | End: 2020-03-16

## 2020-03-09 RX ADMIN — ERYTHROPOIETIN 10000 UNIT(S): 10000 INJECTION, SOLUTION INTRAVENOUS; SUBCUTANEOUS at 17:47

## 2020-03-09 RX ADMIN — BUDESONIDE AND FORMOTEROL FUMARATE DIHYDRATE 2 PUFF(S): 160; 4.5 AEROSOL RESPIRATORY (INHALATION) at 09:49

## 2020-03-09 RX ADMIN — SEVELAMER CARBONATE 1600 MILLIGRAM(S): 2400 POWDER, FOR SUSPENSION ORAL at 12:47

## 2020-03-09 RX ADMIN — Medication 1300 MILLIGRAM(S): at 21:49

## 2020-03-09 RX ADMIN — SEVELAMER CARBONATE 1600 MILLIGRAM(S): 2400 POWDER, FOR SUSPENSION ORAL at 17:43

## 2020-03-09 RX ADMIN — HEPARIN SODIUM 5000 UNIT(S): 5000 INJECTION INTRAVENOUS; SUBCUTANEOUS at 21:47

## 2020-03-09 RX ADMIN — AMLODIPINE BESYLATE 2.5 MILLIGRAM(S): 2.5 TABLET ORAL at 07:24

## 2020-03-09 RX ADMIN — TIOTROPIUM BROMIDE 1 CAPSULE(S): 18 CAPSULE ORAL; RESPIRATORY (INHALATION) at 09:49

## 2020-03-09 RX ADMIN — LORATADINE 10 MILLIGRAM(S): 10 TABLET ORAL at 12:46

## 2020-03-09 RX ADMIN — HEPARIN SODIUM 5000 UNIT(S): 5000 INJECTION INTRAVENOUS; SUBCUTANEOUS at 17:48

## 2020-03-09 RX ADMIN — Medication 1300 MILLIGRAM(S): at 07:24

## 2020-03-09 RX ADMIN — SENNA PLUS 2 TABLET(S): 8.6 TABLET ORAL at 21:49

## 2020-03-09 RX ADMIN — Medication 1300 MILLIGRAM(S): at 12:47

## 2020-03-09 NOTE — PROGRESS NOTE ADULT - SUBJECTIVE AND OBJECTIVE BOX
Patient is a 75y old  Female who presents with a chief complaint of + CINDA, Acute Renal Failure, (08 Mar 2020 13:43)      SUBJECTIVE / OVERNIGHT EVENTS:  feels well  "I am thirsty  NPO for possible IR renal bx.  no cp, no sob, no n/v/d. no abdominal pain.  no headache, no dizziness.         Vital Signs Last 24 Hrs  T(C): 36.8 (09 Mar 2020 09:50), Max: 37.2 (08 Mar 2020 22:00)  T(F): 98.2 (09 Mar 2020 09:50), Max: 98.9 (08 Mar 2020 22:00)  HR: 98 (09 Mar 2020 09:50) (81 - 99)  BP: 121/75 (09 Mar 2020 09:50) (121/75 - 152/86)  BP(mean): --  RR: 18 (09 Mar 2020 09:50) (15 - 18)  SpO2: 100% (09 Mar 2020 09:50) (100% - 100%)  I&O's Summary      PHYSICAL EXAM:  GENERAL: NAD, Comfortable  HEAD:  Atraumatic, Normocephalic  EYES: EOMI, PERRLA, conjunctiva and sclera clear  NECK: Supple, No JVD  CHEST/LUNG: Clear to auscultation bilaterally; No wheeze  HEART: Regular rate and rhythm; No murmurs, rubs, or gallops  ABDOMEN: Soft, Nontender, Nondistended; Bowel sounds present  Neuro: AAO x 3, no focal deficit, 5/5 b/l extremities  EXTREMITIES:  2+ Peripheral Pulses, No clubbing, cyanosis, trace edema  SKIN: No rashes or lesions    LABS:                        7.7    5.39  )-----------( 201      ( 09 Mar 2020 06:06 )             23.0     03-09    133<L>  |  97<L>  |  94<H>  ----------------------------<  96  4.3   |  18<L>  |  13.41<H>    Ca    9.0      09 Mar 2020 06:06  Phos  7.0     03-09  Mg     2.1     03-09    TPro  6.4  /  Alb  3.6  /  TBili  0.3  /  DBili  x   /  AST  13  /  ALT  10  /  AlkPhos  56  03-09    PT/INR - ( 09 Mar 2020 06:06 )   PT: 11.0 SEC;   INR: 0.96          PTT - ( 09 Mar 2020 06:06 )  PTT:46.5 SEC  CAPILLARY BLOOD GLUCOSE      POCT Blood Glucose.: 99 mg/dL (09 Mar 2020 07:33)  POCT Blood Glucose.: 102 mg/dL (08 Mar 2020 21:30)  POCT Blood Glucose.: 138 mg/dL (08 Mar 2020 16:40)  POCT Blood Glucose.: 248 mg/dL (08 Mar 2020 11:08)            RADIOLOGY & ADDITIONAL TESTS:    Imaging Personally Reviewed:  [x] YES  [ ] NO    Consultant(s) Notes Reviewed:  [x] YES  [ ] NO      MEDICATIONS  (STANDING):  amLODIPine   Tablet 2.5 milliGRAM(s) Oral daily  budesonide 160 MICROgram(s)/formoterol 4.5 MICROgram(s) Inhaler 2 Puff(s) Inhalation two times a day  dextrose 5%. 1000 milliLiter(s) (50 mL/Hr) IV Continuous <Continuous>  dextrose 50% Injectable 12.5 Gram(s) IV Push once  dextrose 50% Injectable 25 Gram(s) IV Push once  dextrose 50% Injectable 25 Gram(s) IV Push once  influenza   Vaccine 0.5 milliLiter(s) IntraMuscular once  insulin lispro (HumaLOG) corrective regimen sliding scale   SubCutaneous at bedtime  insulin lispro (HumaLOG) corrective regimen sliding scale   SubCutaneous three times a day before meals  senna 2 Tablet(s) Oral at bedtime  sevelamer carbonate 1600 milliGRAM(s) Oral three times a day with meals  sodium bicarbonate 1300 milliGRAM(s) Oral three times a day  tiotropium 18 MICROgram(s) Capsule 1 Capsule(s) Inhalation daily    MEDICATIONS  (PRN):  dextrose 40% Gel 15 Gram(s) Oral once PRN Blood Glucose LESS THAN 70 milliGRAM(s)/deciliter  glucagon  Injectable 1 milliGRAM(s) IntraMuscular once PRN Glucose LESS THAN 70 milligrams/deciliter  loratadine 10 milliGRAM(s) Oral daily PRN Allergy      Care Discussed with Consultants/Other Providers [x] YES  [ ] NO    HEALTH ISSUES - PROBLEM Dx:  Preventive measure: Preventive measure  HTN (hypertension): HTN (hypertension)  DM (diabetes mellitus), type 2: DM (diabetes mellitus), type 2  Chronic obstructive pulmonary disease (COPD): Chronic obstructive pulmonary disease (COPD)  Leg pain, bilateral: Leg pain, bilateral  Acute renal failure: Acute renal failure  Suspected deep vein thrombosis (DVT)

## 2020-03-09 NOTE — PROGRESS NOTE ADULT - PROBLEM SELECTOR PLAN 5
BP Stable, though borderline high.   started low dose Norvasc 2.5 mg qdaily  Avoid ACEI/ARB, + ARF   BP better

## 2020-03-09 NOTE — CONSULT NOTE ADULT - SUBJECTIVE AND OBJECTIVE BOX
HPI:  Pt is a 75 year old woman with a PMHx DM type 2 on Metformin,  COPD, Anemia, HTN on no meds, OA,  presenting for evaluation of abnormal blood and urine results. She reports that for the last 2 weeks she has been having weakness, fatigue, nausea, headache, loss of appetite, and abdominal/back pain. She also has been urinating less frequently during this time and feels like she has to urinate but has trouble getting it out. She reports that her urine is foamy but does not report burning or pain with urination or bloody urine. She also has had chills, tremors, dry skin, and beige stools. Her abdominal/back pain has been intermittent over the last 6 months. She describes it as an occasional sharp shooting pain throughout her abdomen that she attributes to gas, but thinks that her back pain is from her kidney. She takes one Advil when the pain does not resolve on its own. She has not had fevers, vomiting.  She reports that she used to get frequent UTIs but hasn't since she has retired, and otherwise has no known kidney issues. Her PCP is Dr. Scott [phone]. ER  spoke with the on call physician who reported  that her lab results today showed  Cr 14 Urine protein 300+ Hgb 9.1 HCT 28 K 6.2 and she was told to come to this ED. Pt c/o intermittent Chest pain and SOB, CP is sharp , lasts only for a few minutes, NO CP, NO SOB now, + Wt loss 6-7 pounds in 3 weeks, no fever, no cough, no diarrhea, NO HA, no Dizziness, + Nausea, no Vomit, pt is a EX Smoker,  c/o Legs pain as well chronic but no legs edema, + Heart Burn occasionally, pt is A+P x 4, no distress, has been seen by Renal as well tonight, Renal sonogram c/w: Medical Renal disease, Last EGD/Colonoscopy 3 years ago as per pt unremarkable, no family HX  of CKD,     Labs: UA: trace Blood, Protein 20, Trace Leuk Est.,   Lactate 0.8, Na 132, K+ 5.3, AG 22, , Creatinine 14.04, Glucose 86, LFT Normal,  WBC 5.28, Hgb 8.6, Platelet 251,     Vitals: Tem 98.9, HR 83, /60, RR 17, 100% RA , (05 Mar 2020 23:26)      PAST MEDICAL & SURGICAL HISTORY:  Anemia  Leg pain, bilateral  OA (osteoarthritis)  Former smoker  Chronic obstructive pulmonary disease (COPD)  HTN (hypertension)  DM (diabetes mellitus), type 2  DM (diabetes mellitus)  S/P   S/P hysterectomy: for Fibroid as per pt?      REVIEW OF SYSTEMS    REVIEW OF SYSTEMS:  CONSTITUTIONAL:+ weakness, no fevers or chills  EYES/ENT: No visual changes;  No vertigo or throat pain   NECK: No pain or stiffness  RESPIRATORY: No cough, wheezing, hemoptysis; No shortness of breath  CARDIOVASCULAR: + chest pain or palpitations.  GASTROINTESTINAL: No abdominal or epigastric pain. No nausea, vomiting, or hematemesis; No diarrhea or constipation. No melena or hematochezia.  GENITOURINARY: No dysuria, frequency, foamy urine, urinary urgency, incontinence or hematuria  NEUROLOGICAL: No numbness or weakness  SKIN: No itching, burning, rashes, or lesions   VASCULAR: No bilateral lower extremity edema.     	    MEDICATIONS  (STANDING):  amLODIPine   Tablet 2.5 milliGRAM(s) Oral daily  budesonide 160 MICROgram(s)/formoterol 4.5 MICROgram(s) Inhaler 2 Puff(s) Inhalation two times a day  dextrose 5%. 1000 milliLiter(s) (50 mL/Hr) IV Continuous <Continuous>  dextrose 50% Injectable 12.5 Gram(s) IV Push once  dextrose 50% Injectable 25 Gram(s) IV Push once  dextrose 50% Injectable 25 Gram(s) IV Push once  epoetin jason Injectable 95279 Unit(s) SubCutaneous <User Schedule>  influenza   Vaccine 0.5 milliLiter(s) IntraMuscular once  insulin lispro (HumaLOG) corrective regimen sliding scale   SubCutaneous at bedtime  insulin lispro (HumaLOG) corrective regimen sliding scale   SubCutaneous three times a day before meals  senna 2 Tablet(s) Oral at bedtime  sevelamer carbonate 1600 milliGRAM(s) Oral three times a day with meals  sodium bicarbonate 1300 milliGRAM(s) Oral three times a day  tiotropium 18 MICROgram(s) Capsule 1 Capsule(s) Inhalation daily    MEDICATIONS  (PRN):  dextrose 40% Gel 15 Gram(s) Oral once PRN Blood Glucose LESS THAN 70 milliGRAM(s)/deciliter  glucagon  Injectable 1 milliGRAM(s) IntraMuscular once PRN Glucose LESS THAN 70 milligrams/deciliter  loratadine 10 milliGRAM(s) Oral daily PRN Allergy      Allergies    No Known Allergies    Intolerances        SOCIAL HISTORY:  johan, one child, Lives alone, EX smoker, NO ETOH,        FAMILY HISTORY:  FHx: stomach cancer  Family history of diabetes mellitus (DM)      Vital Signs Last 24 Hrs  T(C): 36.9 (09 Mar 2020 13:35), Max: 37.2 (08 Mar 2020 22:00)  T(F): 98.4 (09 Mar 2020 13:35), Max: 98.9 (08 Mar 2020 22:00)  HR: 101 (09 Mar 2020 13:35) (81 - 101)  BP: 152/92 (09 Mar 2020 13:35) (121/75 - 152/92)  BP(mean): --  RR: 18 (09 Mar 2020 13:35) (17 - 18)  SpO2: 100% (09 Mar 2020 13:35) (100% - 100%)        PHYSICAL EXAM:  Constitutional: NAD  HEENT: anicteric sclera, oropharynx clear, MMM  Neck: No JVD  Respiratory: CTAB, no wheezes, rales or rhonchi  Cardiovascular: S1, S2, RRR  Gastrointestinal: BS+, soft, NT/ND  Extremities: No cyanosis or clubbing. No peripheral edema  Neurological: A/O x 3, no focal deficits  Psychiatric: Normal mood, normal affect  : No CVA tenderness.   Skin: No rashes            LABS:                        7.7    5.39  )-----------( 201      ( 09 Mar 2020 06:06 )             23.0     03-09    133<L>  |  97<L>  |  94<H>  ----------------------------<  96  4.3   |  18<L>  |  13.41<H>    Ca    9.0      09 Mar 2020 06:06  Phos  7.0     03-09  Mg     2.1     03-09    TPro  6.4  /  Alb  3.6  /  TBili  0.3  /  DBili  x   /  AST  13  /  ALT  10  /  AlkPhos  56  03-09    PT/INR - ( 09 Mar 2020 06:06 )   PT: 11.0 SEC;   INR: 0.96          PTT - ( 09 Mar 2020 06:06 )  PTT:46.5 SEC      RADIOLOGY & ADDITIONAL STUDIES: HPI:  Pt is a 75 year old woman with a PMHx DM type 2 on Metformin,  COPD, Anemia, HTN on no meds, OA,  presenting for evaluation of abnormal blood and urine results. She reports that for the last 2 weeks she has been having weakness, fatigue, nausea, headache, loss of appetite, and abdominal/back pain. She also has been urinating less frequently during this time and feels like she has to urinate but has trouble getting it out. She reports that her urine is foamy but does not report burning or pain with urination or bloody urine. She also has had chills, tremors, dry skin, and beige stools. Her abdominal/back pain has been intermittent over the last 6 months. She describes it as an occasional sharp shooting pain throughout her abdomen that she attributes to gas, but thinks that her back pain is from her kidney. She takes one Advil when the pain does not resolve on its own. She has not had fevers, vomiting.  She reports that she used to get frequent UTIs but hasn't since she has retired, and otherwise has no known kidney issues. Her PCP is Dr. Scott [phone]. ER  spoke with the on call physician who reported  that her lab results today showed  Cr 14 Urine protein 300+ Hgb 9.1 HCT 28 K 6.2 and she was told to come to this ED. Pt c/o intermittent Chest pain and SOB, CP is sharp , lasts only for a few minutes, NO CP, NO SOB now, + Wt loss 6-7 pounds in 3 weeks, no fever, no cough, no diarrhea, NO HA, no Dizziness, + Nausea, no Vomit, pt is a EX Smoker,  c/o Legs pain as well chronic but no legs edema, + Heart Burn occasionally, pt is A+P x 4, no distress, has been seen by Renal as well tonight, Renal sonogram c/w: Medical Renal disease, Last EGD/Colonoscopy 3 years ago as per pt unremarkable, no family HX  of CKD,     Labs: UA: trace Blood, Protein 20, Trace Leuk Est.,   Lactate 0.8, Na 132, K+ 5.3, AG 22, , Creatinine 14.04, Glucose 86, LFT Normal,  WBC 5.28, Hgb 8.6, Platelet 251,     Vitals: Tem 98.9, HR 83, /60, RR 17, 100% RA , (05 Mar 2020 23:26)      PAST MEDICAL & SURGICAL HISTORY:  Anemia  Leg pain, bilateral  OA (osteoarthritis)  Former smoker  Chronic obstructive pulmonary disease (COPD)  HTN (hypertension)  DM (diabetes mellitus), type 2  DM (diabetes mellitus)  S/P   S/P hysterectomy: for Fibroid as per pt?      REVIEW OF SYSTEMS    REVIEW OF SYSTEMS:  CONSTITUTIONAL:+ weakness, no fevers or chills  EYES/ENT: No visual changes;  No vertigo or throat pain   NECK: No pain or stiffness  RESPIRATORY: No cough, wheezing, hemoptysis; No shortness of breath  CARDIOVASCULAR: no chest pain or palpitations.  GASTROINTESTINAL: No abdominal or epigastric pain. No nausea, vomiting, or hematemesis; No diarrhea or constipation. No melena or hematochezia.  GENITOURINARY: No dysuria, frequency, foamy urine, urinary urgency, incontinence or hematuria  NEUROLOGICAL: No numbness or weakness  SKIN: No itching, burning, rashes, or lesions   VASCULAR: No bilateral lower extremity edema.     	    MEDICATIONS  (STANDING):  amLODIPine   Tablet 2.5 milliGRAM(s) Oral daily  budesonide 160 MICROgram(s)/formoterol 4.5 MICROgram(s) Inhaler 2 Puff(s) Inhalation two times a day  dextrose 5%. 1000 milliLiter(s) (50 mL/Hr) IV Continuous <Continuous>  dextrose 50% Injectable 12.5 Gram(s) IV Push once  dextrose 50% Injectable 25 Gram(s) IV Push once  dextrose 50% Injectable 25 Gram(s) IV Push once  epoetin jason Injectable 09684 Unit(s) SubCutaneous <User Schedule>  influenza   Vaccine 0.5 milliLiter(s) IntraMuscular once  insulin lispro (HumaLOG) corrective regimen sliding scale   SubCutaneous at bedtime  insulin lispro (HumaLOG) corrective regimen sliding scale   SubCutaneous three times a day before meals  senna 2 Tablet(s) Oral at bedtime  sevelamer carbonate 1600 milliGRAM(s) Oral three times a day with meals  sodium bicarbonate 1300 milliGRAM(s) Oral three times a day  tiotropium 18 MICROgram(s) Capsule 1 Capsule(s) Inhalation daily    MEDICATIONS  (PRN):  dextrose 40% Gel 15 Gram(s) Oral once PRN Blood Glucose LESS THAN 70 milliGRAM(s)/deciliter  glucagon  Injectable 1 milliGRAM(s) IntraMuscular once PRN Glucose LESS THAN 70 milligrams/deciliter  loratadine 10 milliGRAM(s) Oral daily PRN Allergy      Allergies    No Known Allergies    Intolerances        SOCIAL HISTORY:  johan, one child, Lives alone, EX smoker, NO ETOH,        FAMILY HISTORY:  FHx: stomach cancer  Family history of diabetes mellitus (DM)      Vital Signs Last 24 Hrs  T(C): 36.9 (09 Mar 2020 13:35), Max: 37.2 (08 Mar 2020 22:00)  T(F): 98.4 (09 Mar 2020 13:35), Max: 98.9 (08 Mar 2020 22:00)  HR: 101 (09 Mar 2020 13:35) (81 - 101)  BP: 152/92 (09 Mar 2020 13:35) (121/75 - 152/92)  BP(mean): --  RR: 18 (09 Mar 2020 13:35) (17 - 18)  SpO2: 100% (09 Mar 2020 13:35) (100% - 100%)        PHYSICAL EXAM:  Constitutional: NAD  HEENT: anicteric sclera, oropharynx clear, MMM  Neck: No JVD  Respiratory: CTAB, no wheezes, rales or rhonchi  Cardiovascular: S1, S2, RRR  Gastrointestinal: BS+, soft, NT/ND  Extremities: No cyanosis or clubbing. No peripheral edema  Neurological: A/O x 3, no focal deficits  Psychiatric: Normal mood, normal affect  : No CVA tenderness.   Skin: No rashes            LABS:                        7.7    5.39  )-----------( 201      ( 09 Mar 2020 06:06 )             23.0     03-09    133<L>  |  97<L>  |  94<H>  ----------------------------<  96  4.3   |  18<L>  |  13.41<H>    Ca    9.0      09 Mar 2020 06:06  Phos  7.0     03-09  Mg     2.1     03-09    TPro  6.4  /  Alb  3.6  /  TBili  0.3  /  DBili  x   /  AST  13  /  ALT  10  /  AlkPhos  56  03-09    PT/INR - ( 09 Mar 2020 06:06 )   PT: 11.0 SEC;   INR: 0.96          PTT - ( 09 Mar 2020 06:06 )  PTT:46.5 SEC      RADIOLOGY & ADDITIONAL STUDIES:

## 2020-03-09 NOTE — PROGRESS NOTE ADULT - SUBJECTIVE AND OBJECTIVE BOX
[facility] [place] [facility]   MD ABDELRAHMAN TOPETE DO ANAM SIDDIQUI ANGELA WONG, PA    TEL:  OFFICE: [phone]  DR MENDOZA CELL: [phone]  DR. SANTANA CELL: [phone]  DR. MCKEON CELL: [phone]  VALENCIA WELCH CELL: [phone]        Patient is a 75y old  Female who presents with a chief complaint of + CINDA, Acute Renal Failure, (09 Mar 2020 10:47)      Patient seen and examined at bedside. No chest pain/sob    VITALS:  T(F): 98.2 (03-09-20 @ 09:50), Max: 98.9 (03-08-20 @ 22:00)  HR: 98 (03-09-20 @ 09:50)  BP: 121/75 (03-09-20 @ 09:50)  RR: 18 (03-09-20 @ 09:50)  SpO2: 100% (03-09-20 @ 09:50)  Wt(kg): --        PHYSICAL EXAM:  Constitutional: NAD  Neck: No JVD  Respiratory: CTAB, no wheezes, rales or rhonchi  Cardiovascular: S1, S2, RRR  Gastrointestinal: BS+, soft, NT/ND  Extremities: No peripheral edema    Hospital Medications:   MEDICATIONS  (STANDING):  amLODIPine   Tablet 2.5 milliGRAM(s) Oral daily  budesonide 160 MICROgram(s)/formoterol 4.5 MICROgram(s) Inhaler 2 Puff(s) Inhalation two times a day  dextrose 5%. 1000 milliLiter(s) (50 mL/Hr) IV Continuous <Continuous>  dextrose 50% Injectable 12.5 Gram(s) IV Push once  dextrose 50% Injectable 25 Gram(s) IV Push once  dextrose 50% Injectable 25 Gram(s) IV Push once  influenza   Vaccine 0.5 milliLiter(s) IntraMuscular once  insulin lispro (HumaLOG) corrective regimen sliding scale   SubCutaneous at bedtime  insulin lispro (HumaLOG) corrective regimen sliding scale   SubCutaneous three times a day before meals  senna 2 Tablet(s) Oral at bedtime  sevelamer carbonate 1600 milliGRAM(s) Oral three times a day with meals  sodium bicarbonate 1300 milliGRAM(s) Oral three times a day  tiotropium 18 MICROgram(s) Capsule 1 Capsule(s) Inhalation daily      LABS:  03-09    133<L>  |  97<L>  |  94<H>  ----------------------------<  96  4.3   |  18<L>  |  13.41<H>    Ca    9.0      09 Mar 2020 06:06  Phos  7.0     03-09  Mg     2.1     03-09    TPro  6.4  /  Alb  3.6  /  TBili  0.3  /  DBili      /  AST  13  /  ALT  10  /  AlkPhos  56  03-09    Creatinine Trend: 13.41 <--, 12.71 <--, 13.17 <--, 13.09 <--, 14.04 <--    Phosphorus Level, Serum: 7.0 mg/dL (03-09 @ 06:06)  Albumin, Serum: 3.6 g/dL (03-09 @ 06:06)                              7.7    5.39  )-----------( 201      ( 09 Mar 2020 06:06 )             23.0     Urine Studies:  Urinalysis - [03-06-20 @ 06:30]      Color COLORLESS / Appearance CLEAR / SG 1.008 / pH 6.5      Gluc 30 / Ketone NEGATIVE  / Bili NEGATIVE / Urobili NORMAL       Blood TRACE / Protein 20 / Leuk Est NEGATIVE / Nitrite NEGATIVE      RBC 3-5 / WBC 3-5 / Hyaline  / Gran  / Sq Epi  / Non Sq Epi FEW / Bacteria     Urine Creatinine 36.90      [03-06-20 @ 06:30]  Urine Protein 11.8      [03-06-20 @ 06:30]    Iron 107, TIBC 252, %sat --      [03-06-20 @ 06:30]  Ferritin 152.9      [03-06-20 @ 06:30]  .9 (Ca --)      [03-06-20 @ 06:30]   --  HbA1c 6.0      [03-06-20 @ 06:30]  TSH 1.47      [03-06-20 @ 06:30]  Lipid: chol 183, , HDL 35,       [03-06-20 @ 06:30]    HBsAg Nonreactive      [03-06-20 @ 06:30]  HCV 0.10, Nonreactive Hepatitis C AB  S/CO Ratio                        Interpretation  < 1.00                                   Non-Reactive  1.00 - 4.99                         Weakly-Reactive  >= 5.00                                Reactive  Non-Reactive: Aperson with a non-reactive HCV antibody  result is considered uninfected.  No further action is  needed unless recent infection is suspected.  In these  cases, consider repeat testing later to detect  seroconversion..  Weakly-Reactive: HCV antibody test is abnormal, HCV RNA  Qualitative test will follow.  Reactive: HCV antibody test is abnormal, HCV RNA  Qualitative test will follow.  Note: HCV antibody testing is performed on the [employer]   system.      [03-07-20 @ 05:19]  HIV Nonreactive The HIV Ag/Ab Combo test performed screens for HIV-1 p24  antigen, antibodies to HIV-1 (group M and group O), and  antibodies to HIV-2. All specimens repeatedly reactive  will reflex to an HIV 1/2 antibody confirmation and  differentiation test. This assay detects p24 antigen which  may be present prior to the development of HIV antibodies,  therefore a reactive result with a negative HIV 1/2 AB  Confirmation should be followed up with HIV-1 RNA, HIV-2  RNA and repeat testing in 4-8 weeks. A nonreactive result  does not preclude previous exposure to or infection with  HIV-1 or HIV-2.      [03-08-20 @ 05:35]    MARGARITA: titer Negative, pattern --      [03-06-20 @ 06:30]  C3 Complement 91.1      [03-06-20 @ 06:30]  C4 Complement 34.2      [03-06-20 @ 06:30]    RADIOLOGY & ADDITIONAL STUDIES:

## 2020-03-09 NOTE — PROGRESS NOTE ADULT - PROBLEM SELECTOR PLAN 1
ARF, +Anemia: Unclear etiology, Renal follow up, R/O Malignancy.   CT A/P No contrast without acute pathology  Folate, SPEP, UPEP, serum and Urine immunofixation, C3, C4, RF, MARGARITA, ANCA, HIV Test, Hep A,B,C profile, LDH, Hgb Electrophoresis, LDH, Uric Acid, Serum PTH intact, Vit D 1-25 Dihydroxy , Vit D 25 Hydroxy, Erythropoietin Level, Urine Creatinine, Retic Count, Serum Haptoglobin  R/O Multiple Myeloma  Avoid Nephrotoxic agents, Avoid NSAID, Renal Diet, Low K+ diet  IR guided renal biopsy on Monday, to check with IR on Monday morning to make sure she has a spot

## 2020-03-09 NOTE — CONSULT NOTE ADULT - ASSESSMENT
75 year old female  with a PMHx DM type 2 on Metformin,  COPD, Anemia, HTN on no meds, OA, sent by PCP  for acute renal failure      Plan:  Random renal biopsy Tues. 3/10  NPO after MN  hold Lovenox  CBC, BMP and coags in AM

## 2020-03-09 NOTE — PROGRESS NOTE ADULT - ASSESSMENT
75 year old woman with a PMHx DM type 2 on Metformin,  COPD, Anemia, HTN on no meds, OA, sent by pcp for acute renal failure    CINDA on ckd  per note scr 1.4 in aug 2019  now admitted [facility] scr 14.04   ? etiology of CINDA  renal us showed b/l small kidney no hydro, b/l fullness of kidney,   Ct A/P with no hydroneprosis  UA with mild protein  has symptoms of uremia   will need kidney biopsy in view of acute worsen of function  planning for kidney biopsy, f/u IR  monitor bmp. avoid nephrotoxic agents    Proteinuria  mild however in view of worsen renal function will get vasculitis work up  Vasculitis w/u for proteinuria  hep B and Hep C neg, C3, C4 not low, leann neg, hiv neg  Pending rpr, anca, spep, serum immunofixation, immuno light chain    Acidosis  AG   likely sec to uremia  continue sodium bicarb to 1300mg TID   monitor serum Co2    anemia  possible sec to chronic   monitor hb  epo 99100 sq weekly  transfuse if needed    HTN  not on meds  controlled  monitor    CKD-MBD  elevated PTH  Hyperphosphatemia: slightly improving. continue binders TID, low PO4 diet   monitor phos and calcium daily 75 year old woman with a PMHx DM type 2 on Metformin,  COPD, Anemia, HTN on no meds, OA, sent by pcp for acute renal failure    CINDA on ckd  per note scr 1.4 in aug 2019  now admitted [facility] scr 14.04   ? etiology of CINDA  renal us showed b/l small kidney no hydro, b/l fullness of kidney,   Ct A/P with no hydroneprosis  UA with mild protein  has symptoms of uremia   will need kidney biopsy in view of acute worsen of function  planning for kidney biopsy, f/u IR  monitor bmp. avoid nephrotoxic agents    Proteinuria  mild however in view of worsen renal function will get vasculitis work up  Vasculitis w/u for proteinuria  hep B and Hep C neg, C3, C4 not low, leann neg, hiv neg  Pending rpr, anca, spep, serum immunofixation, immuno light chain    Acidosis  AG   likely sec to uremia  continue sodium bicarb to 1300mg TID   monitor serum Co2    Anemia  possible sec to chronic   monitor hb  epo 33902 sq weekly  transfuse if needed    HTN  not on meds  controlled  monitor    CKD-MBD  elevated PTH  Hyperphosphatemia: slightly improving. continue binders TID, low PO4 diet   monitor phos and calcium daily

## 2020-03-10 ENCOUNTER — RESULT REVIEW (OUTPATIENT)
Age: 75
End: 2020-03-10

## 2020-03-10 LAB
ANION GAP SERPL CALC-SCNC: 16 MMO/L — HIGH (ref 7–14)
APTT BLD: 37.1 SEC — HIGH (ref 27.5–36.3)
BLD GP AB SCN SERPL QL: NEGATIVE — SIGNIFICANT CHANGE UP
BUN SERPL-MCNC: 93 MG/DL — HIGH (ref 7–23)
CALCIUM SERPL-MCNC: 9.4 MG/DL — SIGNIFICANT CHANGE UP (ref 8.4–10.5)
CHLORIDE SERPL-SCNC: 94 MMOL/L — LOW (ref 98–107)
CO2 SERPL-SCNC: 20 MMOL/L — LOW (ref 22–31)
CREAT SERPL-MCNC: 13.15 MG/DL — HIGH (ref 0.5–1.3)
EPO SERPL-MCNC: 3.7 MIU/ML — SIGNIFICANT CHANGE UP (ref 2.6–18.5)
GLUCOSE SERPL-MCNC: 97 MG/DL — SIGNIFICANT CHANGE UP (ref 70–99)
HCT VFR BLD CALC: 23.6 % — LOW (ref 34.5–45)
HGB BLD-MCNC: 7.6 G/DL — LOW (ref 11.5–15.5)
INR BLD: 0.93 — SIGNIFICANT CHANGE UP (ref 0.88–1.17)
MCHC RBC-ENTMCNC: 29.8 PG — SIGNIFICANT CHANGE UP (ref 27–34)
MCHC RBC-ENTMCNC: 32.2 % — SIGNIFICANT CHANGE UP (ref 32–36)
MCV RBC AUTO: 92.5 FL — SIGNIFICANT CHANGE UP (ref 80–100)
NRBC # FLD: 0 K/UL — SIGNIFICANT CHANGE UP (ref 0–0)
OSMOLALITY UR: 263 MOSMO/KG — SIGNIFICANT CHANGE UP (ref 50–1200)
PLATELET # BLD AUTO: 224 K/UL — SIGNIFICANT CHANGE UP (ref 150–400)
PMV BLD: 10.5 FL — SIGNIFICANT CHANGE UP (ref 7–13)
POTASSIUM SERPL-MCNC: 4.7 MMOL/L — SIGNIFICANT CHANGE UP (ref 3.5–5.3)
POTASSIUM SERPL-SCNC: 4.7 MMOL/L — SIGNIFICANT CHANGE UP (ref 3.5–5.3)
PROTHROM AB SERPL-ACNC: 10.6 SEC — SIGNIFICANT CHANGE UP (ref 9.8–13.1)
RBC # BLD: 2.55 M/UL — LOW (ref 3.8–5.2)
RBC # FLD: 12.9 % — SIGNIFICANT CHANGE UP (ref 10.3–14.5)
RH IG SCN BLD-IMP: POSITIVE — SIGNIFICANT CHANGE UP
SODIUM SERPL-SCNC: 130 MMOL/L — LOW (ref 135–145)
SODIUM UR-SCNC: 61 MMOL/L — SIGNIFICANT CHANGE UP
WBC # BLD: 5.91 K/UL — SIGNIFICANT CHANGE UP (ref 3.8–10.5)
WBC # FLD AUTO: 5.91 K/UL — SIGNIFICANT CHANGE UP (ref 3.8–10.5)

## 2020-03-10 PROCEDURE — 88350 IMFLUOR EA ADDL 1ANTB STN PX: CPT | Mod: 26

## 2020-03-10 PROCEDURE — 88305 TISSUE EXAM BY PATHOLOGIST: CPT | Mod: 26

## 2020-03-10 PROCEDURE — 77012 CT SCAN FOR NEEDLE BIOPSY: CPT | Mod: 26

## 2020-03-10 PROCEDURE — 88313 SPECIAL STAINS GROUP 2: CPT | Mod: 26

## 2020-03-10 PROCEDURE — 88312 SPECIAL STAINS GROUP 1: CPT | Mod: 26

## 2020-03-10 PROCEDURE — 50200 RENAL BIOPSY PERQ: CPT | Mod: RT

## 2020-03-10 PROCEDURE — 88348 ELECTRON MICROSCOPY DX: CPT | Mod: 26

## 2020-03-10 PROCEDURE — 88346 IMFLUOR 1ST 1ANTB STAIN PX: CPT | Mod: 26

## 2020-03-10 RX ORDER — DESMOPRESSIN ACETATE 0.1 MG/1
20 TABLET ORAL ONCE
Refills: 0 | Status: COMPLETED | OUTPATIENT
Start: 2020-03-10 | End: 2020-03-10

## 2020-03-10 RX ORDER — HEPARIN SODIUM 5000 [USP'U]/ML
5000 INJECTION INTRAVENOUS; SUBCUTANEOUS EVERY 8 HOURS
Refills: 0 | Status: DISCONTINUED | OUTPATIENT
Start: 2020-03-11 | End: 2020-03-16

## 2020-03-10 RX ORDER — POLYETHYLENE GLYCOL 3350 17 G/17G
17 POWDER, FOR SOLUTION ORAL DAILY
Refills: 0 | Status: DISCONTINUED | OUTPATIENT
Start: 2020-03-10 | End: 2020-03-16

## 2020-03-10 RX ADMIN — Medication 1300 MILLIGRAM(S): at 21:40

## 2020-03-10 RX ADMIN — SENNA PLUS 2 TABLET(S): 8.6 TABLET ORAL at 21:40

## 2020-03-10 RX ADMIN — AMLODIPINE BESYLATE 2.5 MILLIGRAM(S): 2.5 TABLET ORAL at 06:40

## 2020-03-10 RX ADMIN — Medication 30 MILLILITER(S): at 23:13

## 2020-03-10 RX ADMIN — DESMOPRESSIN ACETATE 220 MICROGRAM(S): 0.1 TABLET ORAL at 12:00

## 2020-03-10 RX ADMIN — Medication 1300 MILLIGRAM(S): at 06:40

## 2020-03-10 RX ADMIN — Medication 1300 MILLIGRAM(S): at 17:29

## 2020-03-10 RX ADMIN — TIOTROPIUM BROMIDE 1 CAPSULE(S): 18 CAPSULE ORAL; RESPIRATORY (INHALATION) at 09:39

## 2020-03-10 RX ADMIN — Medication 30 MILLILITER(S): at 17:28

## 2020-03-10 RX ADMIN — BUDESONIDE AND FORMOTEROL FUMARATE DIHYDRATE 2 PUFF(S): 160; 4.5 AEROSOL RESPIRATORY (INHALATION) at 09:39

## 2020-03-10 RX ADMIN — LORATADINE 10 MILLIGRAM(S): 10 TABLET ORAL at 00:05

## 2020-03-10 RX ADMIN — SEVELAMER CARBONATE 1600 MILLIGRAM(S): 2400 POWDER, FOR SUSPENSION ORAL at 17:29

## 2020-03-10 NOTE — PROGRESS NOTE ADULT - SUBJECTIVE AND OBJECTIVE BOX
Interventional Radiology Brief - Operative Note    Procedure: CT guided nontarget parenchymal kidney biopsy    Operators: NILS Villa MD, KATH Kiser MD    Anesthesia (type): MAC and local    Contrast: none    EBL: < 1 mL    Findings/Follow up Plan of Care: Core biopsy of Right kidney lower pole cortex x2 performed, with specimens handed to and confirmed by the cytopathology technologist in attendance. Follow up results.    Specimens Removed: as above    Implants: none    Complications: none     Condition/Disposition: stable / recovery suite    Miscellaneous: HOLD prophylactic anticoagulation for 24 hours.    Please call Interventional Radiology with any questions, concerns, or issues.      Official report to follow.

## 2020-03-10 NOTE — PROGRESS NOTE ADULT - PROBLEM SELECTOR PLAN 5
BP Stable, though borderline high.   started low dose Norvasc 2.5 mg qdaily, can uptitrate  Avoid ACEI/ARB, + ARF   BP stable

## 2020-03-10 NOTE — PROGRESS NOTE ADULT - SUBJECTIVE AND OBJECTIVE BOX
[facility]   MD ABDELRAHMAN TOPETE DO ANAM SIDDIQUI ANGELA WONG, PA    TEL:  OFFICE: [phone]  DR MENDOZA CELL: [phone]  DR. SANTANA CELL: [phone]  DR. MCKEON CELL: [phone]  VALENCIA WELCH CELL: [phone]        Patient is a 75y old  Female who presents with a chief complaint of + CINDA, Acute Renal Failure, (10 Mar 2020 13:00)      Patient seen and examined at bedside. No chest pain/sob    VITALS:  T(F): 97.9 (03-10-20 @ 16:00), Max: 98.5 (03-10-20 @ 01:55)  HR: 102 (03-10-20 @ 16:00)  BP: 147/82 (03-10-20 @ 16:00)  RR: 17 (03-10-20 @ 16:00)  SpO2: 98% (03-10-20 @ 16:00)  Wt(kg): --    03-10 @ 07:01  -  03-10 @ 16:27  --------------------------------------------------------  IN: 0 mL / OUT: 0 mL / NET: 0 mL          PHYSICAL EXAM:  Constitutional: NAD  Neck: No JVD  Respiratory: CTAB, no wheezes, rales or rhonchi  Cardiovascular: S1, S2, RRR  Gastrointestinal: BS+, soft, NT/ND  Extremities: No peripheral edema    Hospital Medications:   MEDICATIONS  (STANDING):  aluminum hydroxide/magnesium hydroxide/simethicone Suspension 30 milliLiter(s) Oral once  amLODIPine   Tablet 2.5 milliGRAM(s) Oral daily  budesonide 160 MICROgram(s)/formoterol 4.5 MICROgram(s) Inhaler 2 Puff(s) Inhalation two times a day  desmopressin IVPB 20 MICROGram(s) IV Intermittent once  dextrose 5%. 1000 milliLiter(s) (50 mL/Hr) IV Continuous <Continuous>  dextrose 50% Injectable 12.5 Gram(s) IV Push once  dextrose 50% Injectable 25 Gram(s) IV Push once  dextrose 50% Injectable 25 Gram(s) IV Push once  epoetin jason Injectable 35416 Unit(s) SubCutaneous <User Schedule>  influenza   Vaccine 0.5 milliLiter(s) IntraMuscular once  insulin lispro (HumaLOG) corrective regimen sliding scale   SubCutaneous at bedtime  insulin lispro (HumaLOG) corrective regimen sliding scale   SubCutaneous three times a day before meals  senna 2 Tablet(s) Oral at bedtime  sevelamer carbonate 1600 milliGRAM(s) Oral three times a day with meals  sodium bicarbonate 1300 milliGRAM(s) Oral three times a day  tiotropium 18 MICROgram(s) Capsule 1 Capsule(s) Inhalation daily      LABS:  03-10    130<L>  |  94<L>  |  93<H>  ----------------------------<  97  4.7   |  20<L>  |  13.15<H>    Ca    9.4      10 Mar 2020 05:50  Phos  7.0     03-09  Mg     2.1     03-09    TPro  6.4  /  Alb  3.6  /  TBili  0.3  /  DBili      /  AST  13  /  ALT  10  /  AlkPhos  56  03-09    Creatinine Trend: 13.15 <--, 13.41 <--, 12.71 <--, 13.17 <--, 13.09 <--, 14.04 <--                                7.6    5.91  )-----------( 224      ( 10 Mar 2020 05:50 )             23.6     Urine Studies:  Urinalysis - [03-06-20 @ 06:30]      Color COLORLESS / Appearance CLEAR / SG 1.008 / pH 6.5      Gluc 30 / Ketone NEGATIVE  / Bili NEGATIVE / Urobili NORMAL       Blood TRACE / Protein 20 / Leuk Est NEGATIVE / Nitrite NEGATIVE      RBC 3-5 / WBC 3-5 / Hyaline  / Gran  / Sq Epi  / Non Sq Epi FEW / Bacteria     Urine Creatinine 36.90      [03-06-20 @ 06:30]  Urine Protein 11.8      [03-06-20 @ 06:30]    Iron 107, TIBC 252, %sat --      [03-06-20 @ 06:30]  Ferritin 152.9      [03-06-20 @ 06:30]  .9 (Ca --)      [03-06-20 @ 06:30]   --  HbA1c 6.0      [03-06-20 @ 06:30]  TSH 1.47      [03-06-20 @ 06:30]  Lipid: chol 183, , HDL 35,       [03-06-20 @ 06:30]    HBsAg Nonreactive      [03-06-20 @ 06:30]  HCV 0.10, Nonreactive Hepatitis C AB  S/CO Ratio                        Interpretation  < 1.00                                   Non-Reactive  1.00 - 4.99                         Weakly-Reactive  >= 5.00                                Reactive  Non-Reactive: Aperson with a non-reactive HCV antibody  result is considered uninfected.  No further action is  needed unless recent infection is suspected.  In these  cases, consider repeat testing later to detect  seroconversion..  Weakly-Reactive: HCV antibody test is abnormal, HCV RNA  Qualitative test will follow.  Reactive: HCV antibody test is abnormal, HCV RNA  Qualitative test will follow.  Note: HCV antibody testing is performed on the [employer]   system.      [03-07-20 @ 05:19]  HIV Nonreactive The HIV Ag/Ab Combo test performed screens for HIV-1 p24  antigen, antibodies to HIV-1 (group M and group O), and  antibodies to HIV-2. All specimens repeatedly reactive  will reflex to an HIV 1/2 antibody confirmation and  differentiation test. This assay detects p24 antigen which  may be present prior to the development of HIV antibodies,  therefore a reactive result with a negative HIV 1/2 AB  Confirmation should be followed up with HIV-1 RNA, HIV-2  RNA and repeat testing in 4-8 weeks. A nonreactive result  does not preclude previous exposure to or infection with  HIV-1 or HIV-2.      [03-08-20 @ 05:35]    MARGARITA: titer Negative, pattern --      [03-06-20 @ 06:30]  C3 Complement 91.1      [03-06-20 @ 06:30]  C4 Complement 34.2      [03-06-20 @ 06:30]    RADIOLOGY & ADDITIONAL STUDIES:

## 2020-03-10 NOTE — PROGRESS NOTE ADULT - ASSESSMENT
75 year old woman with a PMHx DM type 2 on Metformin,  COPD, Anemia, HTN on no meds, OA, sent by pcp for acute renal failure    CINDA on ckd  per note scr 1.4 in aug 2019  now admitted [facility] scr 14.04   ? etiology of CINDA  renal us showed b/l small kidney no hydro, b/l fullness of kidney,   Ct A/P with no hydroneprosis  UA with mild protein  has symptoms of uremia   s/p kidney biopsy, 3/10  monitor bmp. avoid nephrotoxic agents    Proteinuria  mild however in view of worsen renal function will get vasculitis work up  Vasculitis w/u for proteinuria  hep B and Hep C neg, C3, C4 not low, leann neg, hiv neg  Pending rpr, anca, spep, serum immunofixation, immuno light chain    Acidosis  AG   likely sec to uremia  continue sodium bicarb to 1300mg TID   monitor serum Co2    Anemia  possible sec to chronic   monitor hb  epo 37720 sq weekly  transfuse if needed    HTN  not on meds  controlled  monitor    CKD-MBD  elevated PTH  Hyperphosphatemia: slightly improving. continue binders TID, low PO4 diet   monitor phos and calcium daily    Hyponatremia  check urine na and osmo  TSH and cortisol level  monitor  currently relatively stable

## 2020-03-10 NOTE — CHART NOTE - NSCHARTNOTEFT_GEN_A_CORE
PRE-INTERVENTIONAL RADIOLOGY PROCEDURE NOTE    Patient Age: 75  Patient Gender: F    Procedure (including site / side if known): Kidney biopsy    Diagnosis / Indication: CINDA on CKD    Interventional Radiology Attending Physician: Dr. Casimiro Saucedo    Ordering Attending Physician: Dr. Garrido    Pertinent medical history: DMII, C.OPD, anemia, hypertension, OA, ARF  Pertinent labs:                       7.6    5.91  )-----------( 224      ( 10 Mar 2020 05:50 )             23.6   03-10    130<L>  |  94<L>  |  93<H>  ----------------------------<  97  4.7   |  20<L>  |  13.15<H>    Ca    9.4      10 Mar 2020 05:50  Phos  7.0     03-09  Mg     2.1     03-09    TPro  6.4  /  Alb  3.6  /  TBili  0.3  /  DBili  x   /  AST  13  /  ALT  10  /  AlkPhos  56  03-09  PT/INR - ( 10 Mar 2020 05:50 )   PT: 10.6 SEC;   INR: 0.93          PTT - ( 10 Mar 2020 05:50 )  PTT:37.1 SEC    Patient and Family aware? Yes           Contact #: Pager [phone]

## 2020-03-10 NOTE — PROGRESS NOTE ADULT - SUBJECTIVE AND OBJECTIVE BOX
Patient is a 75y old  Female who presents with a chief complaint of + CINDA, Acute Renal Failure, (09 Mar 2020 14:57)      SUBJECTIVE / OVERNIGHT EVENTS:  stable overall.  awake alert. comfortable.  denied pain.  no n/v/d.   tolerating diet.   IR renal bx today      Vital Signs Last 24 Hrs  T(C): 36.8 (10 Mar 2020 10:30), Max: 36.9 (09 Mar 2020 13:35)  T(F): 98.2 (10 Mar 2020 10:30), Max: 98.5 (10 Mar 2020 01:55)  HR: 96 (10 Mar 2020 10:30) (89 - 101)  BP: 146/76 (10 Mar 2020 10:30) (127/69 - 152/92)  BP(mean): --  RR: 18 (10 Mar 2020 10:30) (18 - 18)  SpO2: 100% (10 Mar 2020 10:30) (97% - 100%)  I&O's Summary      PHYSICAL EXAM:  GENERAL: NAD, Comfortable  HEAD:  Atraumatic, Normocephalic  EYES: EOMI, PERRLA, conjunctiva and sclera clear  NECK: Supple, No JVD  CHEST/LUNG: Clear to auscultation bilaterally; No wheeze  HEART: Regular rate and rhythm; No murmurs, rubs, or gallops  ABDOMEN: Soft, Nontender, Nondistended; Bowel sounds present  Neuro: AAO x 3, no focal deficit, 5/5 b/l extremities  EXTREMITIES:  2+ Peripheral Pulses, No clubbing, cyanosis, or edema  SKIN: No rashes or lesions    LABS:                        7.6    5.91  )-----------( 224      ( 10 Mar 2020 05:50 )             23.6     03-10    130<L>  |  94<L>  |  93<H>  ----------------------------<  97  4.7   |  20<L>  |  13.15<H>    Ca    9.4      10 Mar 2020 05:50  Phos  7.0     03-09  Mg     2.1     03-09    TPro  6.4  /  Alb  3.6  /  TBili  0.3  /  DBili  x   /  AST  13  /  ALT  10  /  AlkPhos  56  03-09    PT/INR - ( 10 Mar 2020 05:50 )   PT: 10.6 SEC;   INR: 0.93          PTT - ( 10 Mar 2020 05:50 )  PTT:37.1 SEC  CAPILLARY BLOOD GLUCOSE      POCT Blood Glucose.: 103 mg/dL (10 Mar 2020 07:40)  POCT Blood Glucose.: 111 mg/dL (09 Mar 2020 22:05)  POCT Blood Glucose.: 188 mg/dL (09 Mar 2020 16:53)            RADIOLOGY & ADDITIONAL TESTS:    Imaging Personally Reviewed:  [x] YES  [ ] NO    Consultant(s) Notes Reviewed:  [x] YES  [ ] NO      MEDICATIONS  (STANDING):  amLODIPine   Tablet 2.5 milliGRAM(s) Oral daily  budesonide 160 MICROgram(s)/formoterol 4.5 MICROgram(s) Inhaler 2 Puff(s) Inhalation two times a day  desmopressin IVPB 20 MICROGram(s) IV Intermittent once  dextrose 5%. 1000 milliLiter(s) (50 mL/Hr) IV Continuous <Continuous>  dextrose 50% Injectable 12.5 Gram(s) IV Push once  dextrose 50% Injectable 25 Gram(s) IV Push once  dextrose 50% Injectable 25 Gram(s) IV Push once  epoetin jason Injectable 61561 Unit(s) SubCutaneous <User Schedule>  influenza   Vaccine 0.5 milliLiter(s) IntraMuscular once  insulin lispro (HumaLOG) corrective regimen sliding scale   SubCutaneous at bedtime  insulin lispro (HumaLOG) corrective regimen sliding scale   SubCutaneous three times a day before meals  senna 2 Tablet(s) Oral at bedtime  sevelamer carbonate 1600 milliGRAM(s) Oral three times a day with meals  sodium bicarbonate 1300 milliGRAM(s) Oral three times a day  tiotropium 18 MICROgram(s) Capsule 1 Capsule(s) Inhalation daily    MEDICATIONS  (PRN):  dextrose 40% Gel 15 Gram(s) Oral once PRN Blood Glucose LESS THAN 70 milliGRAM(s)/deciliter  glucagon  Injectable 1 milliGRAM(s) IntraMuscular once PRN Glucose LESS THAN 70 milligrams/deciliter  loratadine 10 milliGRAM(s) Oral daily PRN Allergy      Care Discussed with Consultants/Other Providers [x] YES  [ ] NO    HEALTH ISSUES - PROBLEM Dx:  Preventive measure: Preventive measure  HTN (hypertension): HTN (hypertension)  DM (diabetes mellitus), type 2: DM (diabetes mellitus), type 2  Chronic obstructive pulmonary disease (COPD): Chronic obstructive pulmonary disease (COPD)  Leg pain, bilateral: Leg pain, bilateral  Acute renal failure: Acute renal failure  Suspected deep vein thrombosis (DVT)

## 2020-03-10 NOTE — PROGRESS NOTE ADULT - NSHPATTENDINGPLANDISCUSS_GEN_ALL_CORE
medical attending
marcia and PRINCE Damico
marcia and PRINCE Davey
marcia and PRINCE Malone
pt
pt and the daughter

## 2020-03-10 NOTE — CHART NOTE - NSCHARTNOTEFT_GEN_A_CORE
HPI: 75F PMHx DMII, COPD, Anemia, HTN on no meds, OA admitted for ARF and anemia. Upon examination patient was complaining of mid-sternal chest burning that was rated a 8/10 with no radiation. She states the pain started again a couple days ago once she got to the hospital. She states she had some of this burning before she was admitted but noticed it more once she was here and was "getting more sick." It has been getting progressively worse but it comes and goes. She states it is not related to eating or positional with lying down or sitting up. She denies SOB, arm weakness, chest pressure/tightness, HA, blurred vision, sweating, abdominal pain, regurgitation, or worsening burning with food or on an empty stomach. Pt also states she has not had a bowel movement in 3 days. She states she does not have any abdominal pain or discomfort.     Vital Signs Last 24 Hrs  T(C): 37 (10 Mar 2020 21:21), Max: 37 (10 Mar 2020 21:21)  T(F): 98.6 (10 Mar 2020 21:21), Max: 98.6 (10 Mar 2020 21:21)  HR: 100 (10 Mar 2020 21:21) (94 - 102)  BP: 134/74 (10 Mar 2020 21:21) (127/69 - 150/94)  BP(mean): --  RR: 18 (10 Mar 2020 21:21) (17 - 18)  SpO2: 100% (10 Mar 2020 21:21) (97% - 100%)    PE: Patient is AAOx3, stable and lying in bed comfortably.  Lungs: clear to auscultation bilaterally, no wheezing.   CV: regular rate and rhythm. No pain on palpation of chest.   Abd: NBS x 4. Soft, nontender, nondistended on palpation.       A/P:    1. Chest burning  -12 lead EKG  -Start Maalox 30 mL q4hrs PRN for dyspepsia   -Will continue to monitor    2. Constipation   -Continue Senna 2 tabs PO qhs  -Start Miralax 17 g PO qd. Pt denied taking it tonight and stated she does not want to be woken up tonight to go to the bathroom.   -Will continue to monitor for BM HPI: 75F PMHx DMII, COPD, Anemia, HTN on no meds, OA admitted for ARF and anemia. Upon examination patient was complaining of mid-sternal chest burning that was rated a 8/10 with no radiation. She states the pain started again a couple days ago once she got to the hospital. She states she had some of this burning before she was admitted but noticed it more once she was here and was "getting more sick." It has been getting progressively worse but it comes and goes. She states it is not related to eating or positional with lying down or sitting up. She denies SOB, arm weakness, chest pressure/tightness, HA, blurred vision, sweating, abdominal pain, regurgitation, or worsening burning with food or on an empty stomach. Pt also states she has not had a bowel movement in 3 days. She states she does not have any abdominal pain or discomfort.     Vital Signs Last 24 Hrs  T(C): 37 (10 Mar 2020 21:21), Max: 37 (10 Mar 2020 21:21)  T(F): 98.6 (10 Mar 2020 21:21), Max: 98.6 (10 Mar 2020 21:21)  HR: 100 (10 Mar 2020 21:21) (94 - 102)  BP: 134/74 (10 Mar 2020 21:21) (127/69 - 150/94)  BP(mean): --  RR: 18 (10 Mar 2020 21:21) (17 - 18)  SpO2: 100% (10 Mar 2020 21:21) (97% - 100%)    PE: Patient is AAOx3, stable and lying in bed comfortably, in no acute distress.   Lungs: clear to auscultation bilaterally, no wheezing.   CV: regular rate and rhythm. No pain on palpation of chest.   Abd: NBS x 4. Soft, nontender, nondistended on palpation.       A/P:    1. Chest burning  -12 lead EKG showed sinus tachycardia at 102 bpm and no acute ischemic changes. Pt is stable and in no distress.   -Start Maalox 30 mL q4hrs PRN for dyspepsia   -Will continue to monitor for changes    2. Constipation   -Continue Senna 2 tabs PO qhs  -Start Miralax 17 g PO qd. Pt denied taking it tonight and stated she does not want to be woken up tonight to go to the bathroom.   -Will continue to monitor for BM

## 2020-03-10 NOTE — PROGRESS NOTE ADULT - PROBLEM SELECTOR PLAN 1
ARF, +Anemia: Unclear etiology, Renal follow up, R/O Malignancy.   CT A/P No contrast without acute pathology  SPEP, UPEP, serum and Urine immunofixation, C3, C4, RF, MARGARITA, ANCA pending  HIV Test neg  Hep A,B,C profile neg  Hgb Electrophoresis is normal  Avoid Nephrotoxic agents, Avoid NSAID, Renal Diet, Low K+ diet  IR guided renal biopsy Monday 3/10/20 (ddavp x 1), f/u path.  renal f/u (Dr. Viera)

## 2020-03-11 DIAGNOSIS — D64.9 ANEMIA, UNSPECIFIED: ICD-10-CM

## 2020-03-11 LAB
ANION GAP SERPL CALC-SCNC: 18 MMO/L — HIGH (ref 7–14)
APTT BLD: 24.4 SEC — LOW (ref 27.5–36.3)
BUN SERPL-MCNC: 98 MG/DL — HIGH (ref 7–23)
CALCIUM SERPL-MCNC: 9.2 MG/DL — SIGNIFICANT CHANGE UP (ref 8.4–10.5)
CHLORIDE SERPL-SCNC: 93 MMOL/L — LOW (ref 98–107)
CO2 SERPL-SCNC: 21 MMOL/L — LOW (ref 22–31)
CORTIS SERPL-MCNC: 10.4 UG/DL — SIGNIFICANT CHANGE UP (ref 2.7–18.4)
CREAT SERPL-MCNC: 13.18 MG/DL — HIGH (ref 0.5–1.3)
GLUCOSE SERPL-MCNC: 107 MG/DL — HIGH (ref 70–99)
HCT VFR BLD CALC: 20 % — CRITICAL LOW (ref 34.5–45)
HCT VFR BLD CALC: 21.1 % — LOW (ref 34.5–45)
HGB BLD-MCNC: 6.7 G/DL — CRITICAL LOW (ref 11.5–15.5)
HGB BLD-MCNC: 7.1 G/DL — LOW (ref 11.5–15.5)
INR BLD: 0.92 — SIGNIFICANT CHANGE UP (ref 0.88–1.17)
KAPPA FREE LIGHT CHAINS, SERUM: 11.21 MG/DL — HIGH (ref 0.33–1.94)
LAMBDA FREE LIGHT CHAINS, SERUM: 5.69 MG/DL — HIGH (ref 0.57–2.63)
MAGNESIUM SERPL-MCNC: 2.4 MG/DL — SIGNIFICANT CHANGE UP (ref 1.6–2.6)
MCHC RBC-ENTMCNC: 30.2 PG — SIGNIFICANT CHANGE UP (ref 27–34)
MCHC RBC-ENTMCNC: 30.3 PG — SIGNIFICANT CHANGE UP (ref 27–34)
MCHC RBC-ENTMCNC: 33.5 % — SIGNIFICANT CHANGE UP (ref 32–36)
MCHC RBC-ENTMCNC: 33.6 % — SIGNIFICANT CHANGE UP (ref 32–36)
MCV RBC AUTO: 89.8 FL — SIGNIFICANT CHANGE UP (ref 80–100)
MCV RBC AUTO: 90.5 FL — SIGNIFICANT CHANGE UP (ref 80–100)
NRBC # FLD: 0 K/UL — SIGNIFICANT CHANGE UP (ref 0–0)
NRBC # FLD: 0 K/UL — SIGNIFICANT CHANGE UP (ref 0–0)
PHOSPHATE SERPL-MCNC: 6.4 MG/DL — HIGH (ref 2.5–4.5)
PLATELET # BLD AUTO: 170 K/UL — SIGNIFICANT CHANGE UP (ref 150–400)
PLATELET # BLD AUTO: 171 K/UL — SIGNIFICANT CHANGE UP (ref 150–400)
PMV BLD: 10.2 FL — SIGNIFICANT CHANGE UP (ref 7–13)
PMV BLD: 10.6 FL — SIGNIFICANT CHANGE UP (ref 7–13)
POTASSIUM SERPL-MCNC: 4.5 MMOL/L — SIGNIFICANT CHANGE UP (ref 3.5–5.3)
POTASSIUM SERPL-SCNC: 4.5 MMOL/L — SIGNIFICANT CHANGE UP (ref 3.5–5.3)
PROTHROM AB SERPL-ACNC: 10.6 SEC — SIGNIFICANT CHANGE UP (ref 9.8–13.1)
RBC # BLD: 2.21 M/UL — LOW (ref 3.8–5.2)
RBC # BLD: 2.35 M/UL — LOW (ref 3.8–5.2)
RBC # FLD: 13 % — SIGNIFICANT CHANGE UP (ref 10.3–14.5)
RBC # FLD: 13.2 % — SIGNIFICANT CHANGE UP (ref 10.3–14.5)
SODIUM SERPL-SCNC: 132 MMOL/L — LOW (ref 135–145)
T PALLIDUM AB TITR SER: NEGATIVE — SIGNIFICANT CHANGE UP
TSH SERPL-MCNC: 1.87 UIU/ML — SIGNIFICANT CHANGE UP (ref 0.27–4.2)
WBC # BLD: 6.23 K/UL — SIGNIFICANT CHANGE UP (ref 3.8–10.5)
WBC # BLD: 6.56 K/UL — SIGNIFICANT CHANGE UP (ref 3.8–10.5)
WBC # FLD AUTO: 6.23 K/UL — SIGNIFICANT CHANGE UP (ref 3.8–10.5)
WBC # FLD AUTO: 6.56 K/UL — SIGNIFICANT CHANGE UP (ref 3.8–10.5)

## 2020-03-11 RX ORDER — ACETAMINOPHEN 500 MG
650 TABLET ORAL ONCE
Refills: 0 | Status: COMPLETED | OUTPATIENT
Start: 2020-03-11 | End: 2020-03-11

## 2020-03-11 RX ORDER — POLYETHYLENE GLYCOL 3350 17 G/17G
17 POWDER, FOR SOLUTION ORAL DAILY
Refills: 0 | Status: DISCONTINUED | OUTPATIENT
Start: 2020-03-11 | End: 2020-03-11

## 2020-03-11 RX ADMIN — Medication 1300 MILLIGRAM(S): at 05:12

## 2020-03-11 RX ADMIN — BUDESONIDE AND FORMOTEROL FUMARATE DIHYDRATE 2 PUFF(S): 160; 4.5 AEROSOL RESPIRATORY (INHALATION) at 13:21

## 2020-03-11 RX ADMIN — Medication 1300 MILLIGRAM(S): at 21:35

## 2020-03-11 RX ADMIN — SEVELAMER CARBONATE 1600 MILLIGRAM(S): 2400 POWDER, FOR SUSPENSION ORAL at 13:21

## 2020-03-11 RX ADMIN — Medication 650 MILLIGRAM(S): at 15:15

## 2020-03-11 RX ADMIN — TIOTROPIUM BROMIDE 1 CAPSULE(S): 18 CAPSULE ORAL; RESPIRATORY (INHALATION) at 13:21

## 2020-03-11 RX ADMIN — SEVELAMER CARBONATE 1600 MILLIGRAM(S): 2400 POWDER, FOR SUSPENSION ORAL at 18:43

## 2020-03-11 RX ADMIN — Medication 650 MILLIGRAM(S): at 14:33

## 2020-03-11 RX ADMIN — POLYETHYLENE GLYCOL 3350 17 GRAM(S): 17 POWDER, FOR SOLUTION ORAL at 13:31

## 2020-03-11 RX ADMIN — HEPARIN SODIUM 5000 UNIT(S): 5000 INJECTION INTRAVENOUS; SUBCUTANEOUS at 13:22

## 2020-03-11 RX ADMIN — LORATADINE 10 MILLIGRAM(S): 10 TABLET ORAL at 00:19

## 2020-03-11 RX ADMIN — HEPARIN SODIUM 5000 UNIT(S): 5000 INJECTION INTRAVENOUS; SUBCUTANEOUS at 21:35

## 2020-03-11 RX ADMIN — SEVELAMER CARBONATE 1600 MILLIGRAM(S): 2400 POWDER, FOR SUSPENSION ORAL at 08:33

## 2020-03-11 RX ADMIN — AMLODIPINE BESYLATE 2.5 MILLIGRAM(S): 2.5 TABLET ORAL at 05:12

## 2020-03-11 RX ADMIN — SENNA PLUS 2 TABLET(S): 8.6 TABLET ORAL at 21:35

## 2020-03-11 RX ADMIN — Medication 20 MILLIGRAM(S): at 18:43

## 2020-03-11 RX ADMIN — Medication 30 MILLILITER(S): at 13:00

## 2020-03-11 RX ADMIN — Medication 30 MILLILITER(S): at 21:35

## 2020-03-11 RX ADMIN — Medication 1300 MILLIGRAM(S): at 13:22

## 2020-03-11 NOTE — PROGRESS NOTE ADULT - ATTENDING COMMENTS
>30 mins spent discussing kidney biopsy results, treatment, ESRD and dialysis plans going forward
- Dr. ROSEANNA Garrido ([facility])  - [phone]
Db Mar will be covering for me starting 3/11/20. He can be reached at  if needed.     - Dr. ROSEANNA Garrido ([facility])  - [phone]
IR guided renal biopsy on Monday if IR has a spot.   monitor BP. hold all AC.     - [facility]  - [phone]
IR guided renal biopsy on Monday if IR has a spot.   monitor BP. hold all AC.   will start low dose Norvasc 2.5 mg qdaily    - Dr. ROSEANNA Garrido ([facility]  - [phone]
IR guided renal biopsy today if IR has a spot.     - Dr. ROSEANNA Garrido ([facility])  - [phone]

## 2020-03-11 NOTE — PROGRESS NOTE ADULT - ASSESSMENT
75 year old woman with a PMHx DM type 2 on Metformin,  COPD, Anemia, HTN on no meds, OA, sent by pcp for acute renal failure    CINDA on ckd  per note scr 1.4 in aug 2019  now admitted [facility] scr 14.04   ? etiology of CINDA  renal us showed b/l small kidney no hydro, b/l fullness of kidney,   Ct A/P with no hydroneprosis  UA with mild protein  has symptoms of uremia   s/p kidney biopsy, 3/10, prelim report showed oxlate nephropathy   will start prednisone 20mg daily x7 doses  monitor bmp. avoid nephrotoxic agents    Proteinuria  mild however in view of worsen renal function will get vasculitis work up  Vasculitis w/u for proteinuria  hep B and Hep C neg, C3, C4 not low, leann neg, hiv neg, rpr neg, K/L <2  Pending anca, spep, serum immunofixation    Acidosis  AG   likely sec to uremia  on sodium bicarb to 1300mg TID   improving, will decrease to 650 if bicarb improves further  monitor serum Co2    Anemia  possible sec to chronic   monitor hb  epo 47990 sq weekly  transfuse 1unit  s/p kidney biopsy, no tenderness, no hematuria  check CT A/P without contrast if any symptoms    HTN  not on meds  controlled  monitor    CKD-MBD  elevated PTH  Hyperphosphatemia: slightly improving. 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 etiology of CINDA  renal us showed b/l small kidney no hydro, b/l fullness of kidney,   Ct A/P with no hydroneprosis  UA with mild protein  has symptoms of uremia   s/p kidney biopsy, 3/10, prelim report showed oxlate nephropathy with minimal AIN/ATN inflammation   Advised pt to stop all vitamin C ingestion, senna tea and limit her pepsi ingestion  will start prednisone 20mg daily x7 days and reassess.   monitor bmp. avoid nephrotoxic agents    Proteinuria  mild however in view of worsen renal function will get vasculitis work up  Vasculitis w/u for proteinuria  hep B and Hep C neg, C3, C4 not low, leann neg, hiv neg, rpr neg, K/L <2  Pending anca, spep, serum immunofixation    Acidosis  AG   likely sec to uremia  on sodium bicarb to 1300mg TID   improving, will decrease to 650 if bicarb improves further  monitor serum Co2    Anemia  possible sec to chronic   monitor hb  epo 05196 sq weekly  transfuse 1unit  s/p kidney biopsy, no tenderness, no hematuria  check CT A/P without contrast if any symptoms    HTN  not on meds  controlled  monitor    CKD-MBD  elevated PTH  Hyperphosphatemia: slightly improving. continue binders TID, low PO4 diet   monitor phos and calcium daily    Hyponatremia  Improved  urine na high, osmo low  not conclusive   TSH and cortisol level ok  monitor  repeat urine na and osmo

## 2020-03-11 NOTE — PROGRESS NOTE ADULT - SUBJECTIVE AND OBJECTIVE BOX
[facility] [place] [facility]   MD ABDELRAHMAN TOPETE DO ANAM SIDDIQUI ANGELA WONG, PA    TEL:  OFFICE: [phone]  DR MENDOZA CELL: [phone]  DR. SANTANA CELL: [phone]  DR. MCKEON CELL: [phone]  VALENCIA WELCH CELL: [phone]        Patient is a 75y old  Female who presents with a chief complaint of + CINDA, Acute Renal Failure, (11 Mar 2020 10:30)      Patient seen and examined at bedside. No chest pain/sob    VITALS:  T(F): 98.9 (03-11-20 @ 05:10), Max: 98.9 (03-11-20 @ 05:10)  HR: 98 (03-11-20 @ 05:10)  BP: 121/57 (03-11-20 @ 05:10)  RR: 18 (03-11-20 @ 05:10)  SpO2: 99% (03-11-20 @ 05:10)  Wt(kg): --    03-10 @ 07:01  -  03-11 @ 07:00  --------------------------------------------------------  IN: 240 mL / OUT: 450 mL / NET: -210 mL      Height (cm): 152.4 (03-11 @ 03:48)  Weight (kg): 69 (03-11 @ 03:48)  BMI (kg/m2): 29.7 (03-11 @ 03:48)  BSA (m2): 1.66 (03-11 @ 03:48)    PHYSICAL EXAM:  Constitutional: NAD  Neck: No JVD  Respiratory: CTAB, no wheezes, rales or rhonchi  Cardiovascular: S1, S2, RRR  Gastrointestinal: BS+, soft, NT/ND  Extremities: No peripheral edema    Hospital Medications:   MEDICATIONS  (STANDING):  amLODIPine   Tablet 2.5 milliGRAM(s) Oral daily  budesonide 160 MICROgram(s)/formoterol 4.5 MICROgram(s) Inhaler 2 Puff(s) Inhalation two times a day  dextrose 5%. 1000 milliLiter(s) (50 mL/Hr) IV Continuous <Continuous>  dextrose 50% Injectable 12.5 Gram(s) IV Push once  dextrose 50% Injectable 25 Gram(s) IV Push once  dextrose 50% Injectable 25 Gram(s) IV Push once  epoetin jason Injectable 49172 Unit(s) SubCutaneous <User Schedule>  heparin  Injectable 5000 Unit(s) SubCutaneous every 8 hours  influenza   Vaccine 0.5 milliLiter(s) IntraMuscular once  insulin lispro (HumaLOG) corrective regimen sliding scale   SubCutaneous at bedtime  insulin lispro (HumaLOG) corrective regimen sliding scale   SubCutaneous three times a day before meals  polyethylene glycol 3350 17 Gram(s) Oral daily  predniSONE   Tablet 20 milliGRAM(s) Oral daily  senna 2 Tablet(s) Oral at bedtime  sevelamer carbonate 1600 milliGRAM(s) Oral three times a day with meals  sodium bicarbonate 1300 milliGRAM(s) Oral three times a day  tiotropium 18 MICROgram(s) Capsule 1 Capsule(s) Inhalation daily      LABS:  03-11    132<L>  |  93<L>  |  98<H>  ----------------------------<  107<H>  4.5   |  21<L>  |  13.18<H>    Ca    9.2      11 Mar 2020 05:31  Phos  6.4     03-11  Mg     2.4     03-11      Creatinine Trend: 13.18 <--, 13.15 <--, 13.41 <--, 12.71 <--, 13.17 <--, 13.09 <--, 14.04 <--    Phosphorus Level, Serum: 6.4 mg/dL (03-11 @ 05:31)                              7.1    6.23  )-----------( 170      ( 11 Mar 2020 09:20 )             21.1     Urine Studies:  Urinalysis - [03-06-20 @ 06:30]      Color COLORLESS / Appearance CLEAR / SG 1.008 / pH 6.5      Gluc 30 / Ketone NEGATIVE  / Bili NEGATIVE / Urobili NORMAL       Blood TRACE / Protein 20 / Leuk Est NEGATIVE / Nitrite NEGATIVE      RBC 3-5 / WBC 3-5 / Hyaline  / Gran  / Sq Epi  / Non Sq Epi FEW / Bacteria     Urine Creatinine 36.90      [03-06-20 @ 06:30]  Urine Protein 11.8      [03-06-20 @ 06:30]  Urine Sodium 61      [03-10-20 @ 18:30]  Urine Osmolality 263      [03-10-20 @ 18:30]    Iron 107, TIBC 252, %sat --      [03-06-20 @ 06:30]  Ferritin 152.9      [03-06-20 @ 06:30]  .9 (Ca --)      [03-06-20 @ 06:30]   --  HbA1c 6.0      [03-06-20 @ 06:30]  TSH 1.87      [03-11-20 @ 05:31]  Lipid: chol 183, , HDL 35,       [03-06-20 @ 06:30]    HBsAg Nonreactive      [03-06-20 @ 06:30]  HCV 0.10, Nonreactive Hepatitis C AB  S/CO Ratio                        Interpretation  < 1.00                                   Non-Reactive  1.00 - 4.99                         Weakly-Reactive  >= 5.00                                Reactive  Non-Reactive: Aperson with a non-reactive HCV antibody  result is considered uninfected.  No further action is  needed unless recent infection is suspected.  In these  cases, consider repeat testing later to detect  seroconversion..  Weakly-Reactive: HCV antibody test is abnormal, HCV RNA  Qualitative test will follow.  Reactive: HCV antibody test is abnormal, HCV RNA  Qualitative test will follow.  Note: HCV antibody testing is performed on the [employer]   system.      [03-07-20 @ 05:19]  HIV Nonreactive The HIV Ag/Ab Combo test performed screens for HIV-1 p24  antigen, antibodies to HIV-1 (group M and group O), and  antibodies to HIV-2. All specimens repeatedly reactive  will reflex to an HIV 1/2 antibody confirmation and  differentiation test. This assay detects p24 antigen which  may be present prior to the development of HIV antibodies,  therefore a reactive result with a negative HIV 1/2 AB  Confirmation should be followed up with HIV-1 RNA, HIV-2  RNA and repeat testing in 4-8 weeks. A nonreactive result  does not preclude previous exposure to or infection with  HIV-1 or HIV-2.      [03-08-20 @ 05:35]    MARGARITA: titer Negative, pattern --      [03-06-20 @ 06:30]  C3 Complement 91.1      [03-06-20 @ 06:30]  C4 Complement 34.2      [03-06-20 @ 06:30]  Syphilis Screen (Treponema Pallidum Ab) Negative      [03-11-20 @ 05:31]  Free Light Chains: kappa 11.21, lambda 5.69, ratio = --      [03-11 @ 05:31]    RADIOLOGY & ADDITIONAL STUDIES:

## 2020-03-11 NOTE — CHART NOTE - NSCHARTNOTEFT_GEN_A_CORE
patient with worsening anemia today, H&H 6.7/20 repeat 7.1/21.1   spoke with Dr Wahl will give 1 U PRBC split  consent obtained and placed on chart  monitor  assessed patient at bedside- denies pain at kidney biopsy site denies pain upon palpation, no hematoma present biopsy dressing clean dry intact     f/u post transfusion CBC   spoke with Dr Wahl no need for imaging at this time will continue to monitor     d/w Attending and RN

## 2020-03-11 NOTE — PROGRESS NOTE ADULT - SUBJECTIVE AND OBJECTIVE BOX
Patient is a 75y old  Female who presents with a chief complaint of + CINDA, Acute Renal Failure, (09 Mar 2020 14:57)      SUBJECTIVE / OVERNIGHT EVENTS:  stable overall.  awake alert. comfortable.  denied pain.  no n/v/d.   tolerating diet.   no bleeding @ biopsy site  Hgb 7.1      Vital Signs Last 24 Hrs  T(C): 37.2 (11 Mar 2020 05:10), Max: 37.2 (11 Mar 2020 05:10)  T(F): 98.9 (11 Mar 2020 05:10), Max: 98.9 (11 Mar 2020 05:10)  HR: 98 (11 Mar 2020 05:10) (98 - 102)  BP: 121/57 (11 Mar 2020 05:10) (120/62 - 147/82)  BP(mean): --  RR: 18 (11 Mar 2020 05:10) (17 - 18)  SpO2: 99% (11 Mar 2020 05:10) (98% - 100%)    PHYSICAL EXAM:  GENERAL: NAD, Comfortable  HEAD:  Atraumatic, Normocephalic  EYES: EOMI, PERRLA, conjunctiva and sclera clear  NECK: Supple, No JVD  CHEST/LUNG: Clear to auscultation bilaterally; No wheeze  HEART: Regular rate and rhythm; No murmurs, rubs, or gallops  ABDOMEN: Soft, Nontender, Nondistended; Bowel sounds present  Neuro: AAO x 3, no focal deficit, 5/5 b/l extremities  EXTREMITIES:  2+ Peripheral Pulses, No clubbing, cyanosis, or edema  SKIN: No rashes or lesions    LABS:                        7.1    6.23  )-----------( 170      ( 11 Mar 2020 09:20 )             21.1     03-11    132<L>  |  93<L>  |  98<H>  ----------------------------<  107<H>  4.5   |  21<L>  |  13.18<H>    Ca    9.2      11 Mar 2020 05:31  Phos  6.4     03-11  Mg     2.4     03-11      PT/INR - ( 11 Mar 2020 05:31 )   PT: 10.6 SEC;   INR: 0.92          PTT - ( 11 Mar 2020 05:31 )  PTT:24.4 SEC  CAPILLARY BLOOD GLUCOSE      POCT Blood Glucose.: 114 mg/dL (11 Mar 2020 08:25)  POCT Blood Glucose.: 178 mg/dL (10 Mar 2020 21:33)  POCT Blood Glucose.: 189 mg/dL (10 Mar 2020 16:50)

## 2020-03-11 NOTE — PROGRESS NOTE ADULT - PROBLEM SELECTOR PLAN 2
2' ARF  On procrit  Give two half-units of pRBC 3/11/20 2' ARF  On epogen  Give two half-units of pRBC 3/11/20

## 2020-03-12 LAB
ANION GAP SERPL CALC-SCNC: 16 MMO/L — HIGH (ref 7–14)
ANION GAP SERPL CALC-SCNC: 18 MMO/L — HIGH (ref 7–14)
BUN SERPL-MCNC: 87 MG/DL — HIGH (ref 7–23)
BUN SERPL-MCNC: 89 MG/DL — HIGH (ref 7–23)
CALCIUM SERPL-MCNC: 9.1 MG/DL — SIGNIFICANT CHANGE UP (ref 8.4–10.5)
CALCIUM SERPL-MCNC: 9.4 MG/DL — SIGNIFICANT CHANGE UP (ref 8.4–10.5)
CHLORIDE SERPL-SCNC: 86 MMOL/L — LOW (ref 98–107)
CHLORIDE SERPL-SCNC: 90 MMOL/L — LOW (ref 98–107)
CO2 SERPL-SCNC: 21 MMOL/L — LOW (ref 22–31)
CO2 SERPL-SCNC: 22 MMOL/L — SIGNIFICANT CHANGE UP (ref 22–31)
CREAT SERPL-MCNC: 12.3 MG/DL — HIGH (ref 0.5–1.3)
CREAT SERPL-MCNC: 12.67 MG/DL — HIGH (ref 0.5–1.3)
GAS PNL BLDMV: SIGNIFICANT CHANGE UP
GLUCOSE SERPL-MCNC: 163 MG/DL — HIGH (ref 70–99)
GLUCOSE SERPL-MCNC: 173 MG/DL — HIGH (ref 70–99)
HCT VFR BLD CALC: 25.1 % — LOW (ref 34.5–45)
HGB BLD-MCNC: 8.5 G/DL — LOW (ref 11.5–15.5)
MAGNESIUM SERPL-MCNC: 2.5 MG/DL — SIGNIFICANT CHANGE UP (ref 1.6–2.6)
MCHC RBC-ENTMCNC: 30.4 PG — SIGNIFICANT CHANGE UP (ref 27–34)
MCHC RBC-ENTMCNC: 33.9 % — SIGNIFICANT CHANGE UP (ref 32–36)
MCV RBC AUTO: 89.6 FL — SIGNIFICANT CHANGE UP (ref 80–100)
NRBC # FLD: 0 K/UL — SIGNIFICANT CHANGE UP (ref 0–0)
OSMOLALITY UR: 399 MOSMO/KG — SIGNIFICANT CHANGE UP (ref 50–1200)
PHOSPHATE SERPL-MCNC: 5.2 MG/DL — HIGH (ref 2.5–4.5)
PLATELET # BLD AUTO: 156 K/UL — SIGNIFICANT CHANGE UP (ref 150–400)
PMV BLD: 10 FL — SIGNIFICANT CHANGE UP (ref 7–13)
POTASSIUM SERPL-MCNC: 4.6 MMOL/L — SIGNIFICANT CHANGE UP (ref 3.5–5.3)
POTASSIUM SERPL-MCNC: 4.8 MMOL/L — SIGNIFICANT CHANGE UP (ref 3.5–5.3)
POTASSIUM SERPL-SCNC: 4.6 MMOL/L — SIGNIFICANT CHANGE UP (ref 3.5–5.3)
POTASSIUM SERPL-SCNC: 4.8 MMOL/L — SIGNIFICANT CHANGE UP (ref 3.5–5.3)
RBC # BLD: 2.8 M/UL — LOW (ref 3.8–5.2)
RBC # FLD: 12.8 % — SIGNIFICANT CHANGE UP (ref 10.3–14.5)
SODIUM SERPL-SCNC: 126 MMOL/L — LOW (ref 135–145)
SODIUM SERPL-SCNC: 127 MMOL/L — LOW (ref 135–145)
SODIUM UR-SCNC: 117 MMOL/L — SIGNIFICANT CHANGE UP
WBC # BLD: 5.18 K/UL — SIGNIFICANT CHANGE UP (ref 3.8–10.5)
WBC # FLD AUTO: 5.18 K/UL — SIGNIFICANT CHANGE UP (ref 3.8–10.5)

## 2020-03-12 RX ORDER — SODIUM CHLORIDE 9 MG/ML
1 INJECTION INTRAMUSCULAR; INTRAVENOUS; SUBCUTANEOUS THREE TIMES A DAY
Refills: 0 | Status: DISCONTINUED | OUTPATIENT
Start: 2020-03-12 | End: 2020-03-13

## 2020-03-12 RX ORDER — ACETAMINOPHEN 500 MG
650 TABLET ORAL ONCE
Refills: 0 | Status: COMPLETED | OUTPATIENT
Start: 2020-03-12 | End: 2020-03-12

## 2020-03-12 RX ADMIN — TIOTROPIUM BROMIDE 1 CAPSULE(S): 18 CAPSULE ORAL; RESPIRATORY (INHALATION) at 09:10

## 2020-03-12 RX ADMIN — Medication 20 MILLIGRAM(S): at 05:20

## 2020-03-12 RX ADMIN — Medication 650 MILLIGRAM(S): at 01:32

## 2020-03-12 RX ADMIN — LORATADINE 10 MILLIGRAM(S): 10 TABLET ORAL at 00:21

## 2020-03-12 RX ADMIN — POLYETHYLENE GLYCOL 3350 17 GRAM(S): 17 POWDER, FOR SOLUTION ORAL at 13:00

## 2020-03-12 RX ADMIN — SEVELAMER CARBONATE 1600 MILLIGRAM(S): 2400 POWDER, FOR SUSPENSION ORAL at 13:00

## 2020-03-12 RX ADMIN — Medication 1300 MILLIGRAM(S): at 13:01

## 2020-03-12 RX ADMIN — SEVELAMER CARBONATE 1600 MILLIGRAM(S): 2400 POWDER, FOR SUSPENSION ORAL at 08:50

## 2020-03-12 RX ADMIN — SODIUM CHLORIDE 1 GRAM(S): 9 INJECTION INTRAMUSCULAR; INTRAVENOUS; SUBCUTANEOUS at 19:26

## 2020-03-12 RX ADMIN — Medication 30 MILLILITER(S): at 23:54

## 2020-03-12 RX ADMIN — Medication 1300 MILLIGRAM(S): at 22:13

## 2020-03-12 RX ADMIN — HEPARIN SODIUM 5000 UNIT(S): 5000 INJECTION INTRAVENOUS; SUBCUTANEOUS at 15:01

## 2020-03-12 RX ADMIN — HEPARIN SODIUM 5000 UNIT(S): 5000 INJECTION INTRAVENOUS; SUBCUTANEOUS at 05:20

## 2020-03-12 RX ADMIN — Medication 5 MILLIGRAM(S): at 08:52

## 2020-03-12 RX ADMIN — AMLODIPINE BESYLATE 2.5 MILLIGRAM(S): 2.5 TABLET ORAL at 05:20

## 2020-03-12 RX ADMIN — SEVELAMER CARBONATE 1600 MILLIGRAM(S): 2400 POWDER, FOR SUSPENSION ORAL at 17:30

## 2020-03-12 RX ADMIN — Medication 650 MILLIGRAM(S): at 02:30

## 2020-03-12 RX ADMIN — Medication 1: at 13:01

## 2020-03-12 RX ADMIN — SENNA PLUS 2 TABLET(S): 8.6 TABLET ORAL at 22:13

## 2020-03-12 RX ADMIN — BUDESONIDE AND FORMOTEROL FUMARATE DIHYDRATE 2 PUFF(S): 160; 4.5 AEROSOL RESPIRATORY (INHALATION) at 08:50

## 2020-03-12 RX ADMIN — HEPARIN SODIUM 5000 UNIT(S): 5000 INJECTION INTRAVENOUS; SUBCUTANEOUS at 22:13

## 2020-03-12 RX ADMIN — Medication 1300 MILLIGRAM(S): at 05:20

## 2020-03-12 NOTE — PROGRESS NOTE ADULT - ASSESSMENT
75 year old woman with a PMHx DM type 2 on Metformin,  COPD, Anemia, HTN on no meds, OA, sent by pcp for acute renal failure    CINDA on ckd  per note scr 1.4 in aug 2019  now admitted [facility] scr 14.04   ? 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 avoid nephrotoxic agents    Proteinuria  mild however in view of worsen renal function will get vasculitis work up  Vasculitis w/u for proteinuria  hep B and Hep C neg, C3, C4 not low, leann neg, hiv neg, rpr neg, K/L <2  Pending anca, spep, serum immunofixation    Acidosis  AG   likely sec to uremia  on sodium bicarb to 1300mg TID   improving  monitor serum Co2    Anemia  possible sec to chronic   monitor hb  epo [id number] sq weekly  transfuse 1unit  s/p kidney biopsy, no tenderness, no hematuria  check CT A/P without contrast if any symptoms    HTN  not on meds  controlled  monitor    CKD-MBD  elevated PTH  Hyperphosphatemia: slightly improving. continue binders TID, low PO4 diet   monitor phos and calcium daily    Hyponatremia  worsen  urine work up suggested SIADH  free water restiction <1L/day, pt educated  repeat bmp @ 3pm, start Na tab 1g tid if Na worsen on repeat  TSH and cortisol level ok  monitor 75 year old woman with a PMHx DM type 2 on Metformin,  COPD, Anemia, HTN on no meds, OA, sent by pcp for acute renal failure    CINDA on ckd  per note scr 1.4 in aug 2019  now admitted [facility] scr 14.04   ? 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 avoid nephrotoxic agents    Proteinuria  mild however in view of worsen renal function will get vasculitis work up  Vasculitis w/u for proteinuria  hep B and Hep C neg, C3, C4 not low, leann neg, hiv neg, rpr neg, K/L <2  Pending anca, spep, serum immunofixation    Acidosis  AG   likely sec to uremia  on sodium bicarb to 1300mg TID   improving  monitor serum Co2    Anemia  possible sec to chronic   monitor hb  epo [id number] sq weekly  transfuse 1unit  s/p kidney biopsy, no tenderness, no hematuria  check CT A/P without contrast if any symptoms    HTN  not on meds  controlled  monitor    CKD-MBD  elevated PTH  Hyperphosphatemia: slightly improving. continue binders TID, low PO4 diet   monitor phos and calcium daily    Hyponatremia  worsen  urine work up suggested SIADH  free water restiction <1L/day, pt educated  repeat bmp @ 3pm, start Na tab 1g tid if Na worsen on repeat  TSH and cortisol level ok  monitor

## 2020-03-12 NOTE — PROGRESS NOTE ADULT - PROBLEM SELECTOR PLAN 1
CT A/P No contrast without acute pathology  SPEP, UPEP, serum and Urine immunofixation, C3, C4, RF, MARGARITA, ANCA pending  HIV Test neg  Hep A,B,C profile neg  Hgb Electrophoresis is normal  Avoid Nephrotoxic agents, Avoid NSAID, Renal Diet, Low K+ diet  IR guided renal biopsy Monday 3/10/20 (ddavp x 1), path preliminary showing oxalate nephropathy -> now on PO prednisone trial  renal f/u (Dr. Viera)

## 2020-03-12 NOTE — PROGRESS NOTE ADULT - SUBJECTIVE AND OBJECTIVE BOX
Patient is a 75y old  Female who presents with a chief complaint of + CINDA, Acute Renal Failure, (09 Mar 2020 14:57)      SUBJECTIVE / OVERNIGHT EVENTS:  stable overall.  awake alert. comfortable.  denied pain.  no n/v/d.   kidney biopsy preliminarily showing oxalate nephropathy, started on prednisone      Vital Signs Last 24 Hrs  T(C): 36.7 (12 Mar 2020 04:00), Max: 37 (11 Mar 2020 12:05)  T(F): 98.1 (12 Mar 2020 04:00), Max: 98.6 (11 Mar 2020 12:05)  HR: 83 (12 Mar 2020 05:18) (83 - 89)  BP: 146/71 (12 Mar 2020 05:18) (133/73 - 146/71)  BP(mean): --  RR: 18 (12 Mar 2020 04:00) (16 - 18)  SpO2: 100% (12 Mar 2020 04:00) (100% - 100%)    PHYSICAL EXAM:  GENERAL: NAD, Comfortable  HEAD:  Atraumatic, Normocephalic  EYES: EOMI, PERRLA, conjunctiva and sclera clear  NECK: Supple, No JVD  CHEST/LUNG: Clear to auscultation bilaterally; No wheeze  HEART: Regular rate and rhythm; No murmurs, rubs, or gallops  ABDOMEN: Soft, Nontender, Nondistended; Bowel sounds present  Neuro: AAO x 3, no focal deficit, 5/5 b/l extremities  EXTREMITIES:  2+ Peripheral Pulses, No clubbing, cyanosis, or edema  SKIN: No rashes or lesions    LABS:                        8.5    5.18  )-----------( 156      ( 12 Mar 2020 05:00 )             25.1     03-12    127<L>  |  90<L>  |  89<H>  ----------------------------<  163<H>  4.6   |  21<L>  |  12.67<H>    Ca    9.4      12 Mar 2020 05:00  Phos  5.2     03-12  Mg     2.5     03-12      PT/INR - ( 11 Mar 2020 05:31 )   PT: 10.6 SEC;   INR: 0.92          PTT - ( 11 Mar 2020 05:31 )  PTT:24.4 SEC  CAPILLARY BLOOD GLUCOSE      POCT Blood Glucose.: 151 mg/dL (12 Mar 2020 08:35)  POCT Blood Glucose.: 148 mg/dL (11 Mar 2020 21:49)  POCT Blood Glucose.: 199 mg/dL (11 Mar 2020 17:08)  POCT Blood Glucose.: 168 mg/dL (11 Mar 2020 12:13)

## 2020-03-12 NOTE — PROGRESS NOTE ADULT - SUBJECTIVE AND OBJECTIVE BOX
[facility] [place] [facility]   MD ABDELRAHMAN TOPETE DO ANAM SIDDIQUI ANGELA WONG, PA    TEL:  OFFICE: [phone]  DR MENDOZA CELL: [phone]  DR. SANTANA CELL: [phone]  DR. MCKEON CELL: [phone]  VALENCIA WELCH CELL: [phone]        Patient is a 75y old  Female who presents with a chief complaint of + CINDA, Acute Renal Failure, (12 Mar 2020 11:14)      Patient seen and examined at bedside. No chest pain/sob    VITALS:  T(F): 98.6 (03-12-20 @ 12:37), Max: 98.6 (03-11-20 @ 20:03)  HR: 95 (03-12-20 @ 12:37)  BP: 139/71 (03-12-20 @ 12:37)  RR: 18 (03-12-20 @ 12:37)  SpO2: 100% (03-12-20 @ 12:37)  Wt(kg): --    03-11 @ 07:01  -  03-12 @ 07:00  --------------------------------------------------------  IN: 600 mL / OUT: 600 mL / NET: 0 mL          PHYSICAL EXAM:  Constitutional: NAD  Neck: No JVD  Respiratory: CTAB, no wheezes, rales or rhonchi  Cardiovascular: S1, S2, RRR  Gastrointestinal: BS+, soft, NT/ND  Extremities: No peripheral edema    Hospital Medications:   MEDICATIONS  (STANDING):  amLODIPine   Tablet 2.5 milliGRAM(s) Oral daily  budesonide 160 MICROgram(s)/formoterol 4.5 MICROgram(s) Inhaler 2 Puff(s) Inhalation two times a day  dextrose 5%. 1000 milliLiter(s) (50 mL/Hr) IV Continuous <Continuous>  dextrose 50% Injectable 12.5 Gram(s) IV Push once  dextrose 50% Injectable 25 Gram(s) IV Push once  dextrose 50% Injectable 25 Gram(s) IV Push once  epoetin jason Injectable 99553 Unit(s) SubCutaneous <User Schedule>  heparin  Injectable 5000 Unit(s) SubCutaneous every 8 hours  influenza   Vaccine 0.5 milliLiter(s) IntraMuscular once  insulin lispro (HumaLOG) corrective regimen sliding scale   SubCutaneous at bedtime  insulin lispro (HumaLOG) corrective regimen sliding scale   SubCutaneous three times a day before meals  polyethylene glycol 3350 17 Gram(s) Oral daily  predniSONE   Tablet 20 milliGRAM(s) Oral daily  senna 2 Tablet(s) Oral at bedtime  sevelamer carbonate 1600 milliGRAM(s) Oral three times a day with meals  sodium bicarbonate 1300 milliGRAM(s) Oral three times a day  tiotropium 18 MICROgram(s) Capsule 1 Capsule(s) Inhalation daily      LABS:  03-12    127<L>  |  90<L>  |  89<H>  ----------------------------<  163<H>  4.6   |  21<L>  |  12.67<H>    Ca    9.4      12 Mar 2020 05:00  Phos  5.2     03-12  Mg     2.5     03-12      Creatinine Trend: 12.67 <--, 13.18 <--, 13.15 <--, 13.41 <--, 12.71 <--, 13.17 <--, 13.09 <--, 14.04 <--    Phosphorus Level, Serum: 5.2 mg/dL (03-12 @ 05:00)                              8.5    5.18  )-----------( 156      ( 12 Mar 2020 05:00 )             25.1     Urine Studies:  Urinalysis - [03-06-20 @ 06:30]      Color COLORLESS / Appearance CLEAR / SG 1.008 / pH 6.5      Gluc 30 / Ketone NEGATIVE  / Bili NEGATIVE / Urobili NORMAL       Blood TRACE / Protein 20 / Leuk Est NEGATIVE / Nitrite NEGATIVE      RBC 3-5 / WBC 3-5 / Hyaline  / Gran  / Sq Epi  / Non Sq Epi FEW / Bacteria     Urine Creatinine 36.90      [03-06-20 @ 06:30]  Urine Protein 11.8      [03-06-20 @ 06:30]  Urine Sodium 117      [03-11-20 @ 23:00]  Urine Osmolality 399      [03-11-20 @ 23:00]    Iron 107, TIBC 252, %sat --      [03-06-20 @ 06:30]  Ferritin 152.9      [03-06-20 @ 06:30]  .9 (Ca --)      [03-06-20 @ 06:30]   --  HbA1c 6.0      [03-06-20 @ 06:30]  TSH 1.87      [03-11-20 @ 05:31]  Lipid: chol 183, , HDL 35,       [03-06-20 @ 06:30]    HBsAg Nonreactive      [03-06-20 @ 06:30]  HCV 0.10, Nonreactive Hepatitis C AB  S/CO Ratio                        Interpretation  < 1.00                                   Non-Reactive  1.00 - 4.99                         Weakly-Reactive  >= 5.00                                Reactive  Non-Reactive: Aperson with a non-reactive HCV antibody  result is considered uninfected.  No further action is  needed unless recent infection is suspected.  In these  cases, consider repeat testing later to detect  seroconversion..  Weakly-Reactive: HCV antibody test is abnormal, HCV RNA  Qualitative test will follow.  Reactive: HCV antibody test is abnormal, HCV RNA  Qualitative test will follow.  Note: HCV antibody testing is performed on the [employer] system.      [03-07-20 @ 05:19]  HIV Nonreactive The HIV Ag/Ab Combo test performed screens for HIV-1 p24  antigen, antibodies to HIV-1 (group M and group O), and  antibodies to HIV-2. All specimens repeatedly reactive  will reflex to an HIV 1/2 antibody confirmation and  differentiation test. This assay detects p24 antigen which  may be present prior to the development of HIV antibodies,  therefore a reactive result with a negative HIV 1/2 AB  Confirmation should be followed up with HIV-1 RNA, HIV-2  RNA and repeat testing in 4-8 weeks. A nonreactive result  does not preclude previous exposure to or infection with  HIV-1 or HIV-2.      [03-08-20 @ 05:35]    MARGARITA: titer Negative, pattern --      [03-06-20 @ 06:30]  C3 Complement 91.1      [03-06-20 @ 06:30]  C4 Complement 34.2      [03-06-20 @ 06:30]  Syphilis Screen (Treponema Pallidum Ab) Negative      [03-11-20 @ 05:31]  Free Light Chains: kappa 11.21, lambda 5.69, ratio = --      [03-11 @ 05:31]    RADIOLOGY & ADDITIONAL STUDIES:

## 2020-03-13 ENCOUNTER — TRANSCRIPTION ENCOUNTER (OUTPATIENT)
Age: 75
End: 2020-03-13

## 2020-03-13 DIAGNOSIS — E87.1 HYPO-OSMOLALITY AND HYPONATREMIA: ICD-10-CM

## 2020-03-13 LAB
ANION GAP SERPL CALC-SCNC: 16 MMO/L — HIGH (ref 7–14)
ANION GAP SERPL CALC-SCNC: 19 MMO/L — HIGH (ref 7–14)
BUN SERPL-MCNC: 86 MG/DL — HIGH (ref 7–23)
BUN SERPL-MCNC: 88 MG/DL — HIGH (ref 7–23)
CALCIUM SERPL-MCNC: 8.8 MG/DL — SIGNIFICANT CHANGE UP (ref 8.4–10.5)
CALCIUM SERPL-MCNC: 8.9 MG/DL — SIGNIFICANT CHANGE UP (ref 8.4–10.5)
CHLORIDE SERPL-SCNC: 83 MMOL/L — LOW (ref 98–107)
CHLORIDE SERPL-SCNC: 87 MMOL/L — LOW (ref 98–107)
CO2 SERPL-SCNC: 22 MMOL/L — SIGNIFICANT CHANGE UP (ref 22–31)
CO2 SERPL-SCNC: 23 MMOL/L — SIGNIFICANT CHANGE UP (ref 22–31)
CREAT SERPL-MCNC: 12.15 MG/DL — HIGH (ref 0.5–1.3)
CREAT SERPL-MCNC: 12.18 MG/DL — HIGH (ref 0.5–1.3)
GAS PNL BLDMV: SIGNIFICANT CHANGE UP
GAS PNL BLDMV: SIGNIFICANT CHANGE UP
GLUCOSE SERPL-MCNC: 238 MG/DL — HIGH (ref 70–99)
GLUCOSE SERPL-MCNC: 96 MG/DL — SIGNIFICANT CHANGE UP (ref 70–99)
HCT VFR BLD CALC: 24.7 % — LOW (ref 34.5–45)
HGB BLD-MCNC: 8.3 G/DL — LOW (ref 11.5–15.5)
KAPPA/LAMBDA FREE LIGHT CHAIN RATIO, SERUM: SIGNIFICANT CHANGE UP
MAGNESIUM SERPL-MCNC: 2.6 MG/DL — SIGNIFICANT CHANGE UP (ref 1.6–2.6)
MAGNESIUM SERPL-MCNC: 2.7 MG/DL — HIGH (ref 1.6–2.6)
MCHC RBC-ENTMCNC: 30 PG — SIGNIFICANT CHANGE UP (ref 27–34)
MCHC RBC-ENTMCNC: 33.6 % — SIGNIFICANT CHANGE UP (ref 32–36)
MCV RBC AUTO: 89.2 FL — SIGNIFICANT CHANGE UP (ref 80–100)
NRBC # FLD: 0 K/UL — SIGNIFICANT CHANGE UP (ref 0–0)
PHOSPHATE SERPL-MCNC: 4.3 MG/DL — SIGNIFICANT CHANGE UP (ref 2.5–4.5)
PHOSPHATE SERPL-MCNC: 4.4 MG/DL — SIGNIFICANT CHANGE UP (ref 2.5–4.5)
PLATELET # BLD AUTO: 173 K/UL — SIGNIFICANT CHANGE UP (ref 150–400)
PMV BLD: 10.4 FL — SIGNIFICANT CHANGE UP (ref 7–13)
POTASSIUM SERPL-MCNC: 4.2 MMOL/L — SIGNIFICANT CHANGE UP (ref 3.5–5.3)
POTASSIUM SERPL-MCNC: 5 MMOL/L — SIGNIFICANT CHANGE UP (ref 3.5–5.3)
POTASSIUM SERPL-SCNC: 4.2 MMOL/L — SIGNIFICANT CHANGE UP (ref 3.5–5.3)
POTASSIUM SERPL-SCNC: 5 MMOL/L — SIGNIFICANT CHANGE UP (ref 3.5–5.3)
PROT SERPL-MCNC: 6 G/DL — SIGNIFICANT CHANGE UP (ref 6–8.3)
PROT UR-MCNC: 30.7 MG/DL — SIGNIFICANT CHANGE UP
RBC # BLD: 2.77 M/UL — LOW (ref 3.8–5.2)
RBC # FLD: 12.9 % — SIGNIFICANT CHANGE UP (ref 10.3–14.5)
SODIUM SERPL-SCNC: 125 MMOL/L — LOW (ref 135–145)
SODIUM SERPL-SCNC: 125 MMOL/L — LOW (ref 135–145)
WBC # BLD: 6.95 K/UL — SIGNIFICANT CHANGE UP (ref 3.8–10.5)
WBC # FLD AUTO: 6.95 K/UL — SIGNIFICANT CHANGE UP (ref 3.8–10.5)

## 2020-03-13 PROCEDURE — 84165 PROTEIN E-PHORESIS SERUM: CPT | Mod: 26

## 2020-03-13 RX ORDER — SODIUM CHLORIDE 9 MG/ML
2 INJECTION INTRAMUSCULAR; INTRAVENOUS; SUBCUTANEOUS THREE TIMES A DAY
Refills: 0 | Status: DISCONTINUED | OUTPATIENT
Start: 2020-03-13 | End: 2020-03-14

## 2020-03-13 RX ADMIN — Medication 1300 MILLIGRAM(S): at 13:07

## 2020-03-13 RX ADMIN — SODIUM CHLORIDE 1 GRAM(S): 9 INJECTION INTRAMUSCULAR; INTRAVENOUS; SUBCUTANEOUS at 13:08

## 2020-03-13 RX ADMIN — TIOTROPIUM BROMIDE 1 CAPSULE(S): 18 CAPSULE ORAL; RESPIRATORY (INHALATION) at 08:41

## 2020-03-13 RX ADMIN — HEPARIN SODIUM 5000 UNIT(S): 5000 INJECTION INTRAVENOUS; SUBCUTANEOUS at 05:54

## 2020-03-13 RX ADMIN — Medication 20 MILLIGRAM(S): at 05:53

## 2020-03-13 RX ADMIN — SEVELAMER CARBONATE 1600 MILLIGRAM(S): 2400 POWDER, FOR SUSPENSION ORAL at 08:40

## 2020-03-13 RX ADMIN — BUDESONIDE AND FORMOTEROL FUMARATE DIHYDRATE 2 PUFF(S): 160; 4.5 AEROSOL RESPIRATORY (INHALATION) at 08:40

## 2020-03-13 RX ADMIN — Medication 1300 MILLIGRAM(S): at 22:12

## 2020-03-13 RX ADMIN — SEVELAMER CARBONATE 1600 MILLIGRAM(S): 2400 POWDER, FOR SUSPENSION ORAL at 17:32

## 2020-03-13 RX ADMIN — AMLODIPINE BESYLATE 2.5 MILLIGRAM(S): 2.5 TABLET ORAL at 05:53

## 2020-03-13 RX ADMIN — Medication 1300 MILLIGRAM(S): at 05:53

## 2020-03-13 RX ADMIN — POLYETHYLENE GLYCOL 3350 17 GRAM(S): 17 POWDER, FOR SOLUTION ORAL at 13:07

## 2020-03-13 RX ADMIN — HEPARIN SODIUM 5000 UNIT(S): 5000 INJECTION INTRAVENOUS; SUBCUTANEOUS at 22:11

## 2020-03-13 RX ADMIN — BUDESONIDE AND FORMOTEROL FUMARATE DIHYDRATE 2 PUFF(S): 160; 4.5 AEROSOL RESPIRATORY (INHALATION) at 22:11

## 2020-03-13 RX ADMIN — LORATADINE 10 MILLIGRAM(S): 10 TABLET ORAL at 01:10

## 2020-03-13 RX ADMIN — HEPARIN SODIUM 5000 UNIT(S): 5000 INJECTION INTRAVENOUS; SUBCUTANEOUS at 13:07

## 2020-03-13 RX ADMIN — SODIUM CHLORIDE 1 GRAM(S): 9 INJECTION INTRAMUSCULAR; INTRAVENOUS; SUBCUTANEOUS at 05:53

## 2020-03-13 RX ADMIN — SODIUM CHLORIDE 2 GRAM(S): 9 INJECTION INTRAMUSCULAR; INTRAVENOUS; SUBCUTANEOUS at 15:59

## 2020-03-13 RX ADMIN — SEVELAMER CARBONATE 1600 MILLIGRAM(S): 2400 POWDER, FOR SUSPENSION ORAL at 13:07

## 2020-03-13 RX ADMIN — SENNA PLUS 2 TABLET(S): 8.6 TABLET ORAL at 22:11

## 2020-03-13 RX ADMIN — SODIUM CHLORIDE 2 GRAM(S): 9 INJECTION INTRAMUSCULAR; INTRAVENOUS; SUBCUTANEOUS at 22:12

## 2020-03-13 NOTE — PROGRESS NOTE ADULT - SUBJECTIVE AND OBJECTIVE BOX
Patient is a 75y old  Female who presents with a chief complaint of + CINDA, Acute Renal Failure, (09 Mar 2020 14:57)      SUBJECTIVE / OVERNIGHT EVENTS:  stable overall.  awake alert. comfortable.  denied pain.  no n/v/d.   kidney biopsy preliminarily showing oxalate nephropathy, on prednisone  on salt tabs for SIADH-induced hyponatremia    Vital Signs Last 24 Hrs  T(C): 36.8 (13 Mar 2020 05:51), Max: 37 (12 Mar 2020 12:37)  T(F): 98.2 (13 Mar 2020 05:51), Max: 98.6 (12 Mar 2020 12:37)  HR: 79 (13 Mar 2020 05:51) (79 - 95)  BP: 120/64 (13 Mar 2020 05:51) (120/64 - 139/71)  BP(mean): --  RR: 18 (13 Mar 2020 05:51) (16 - 18)  SpO2: 100% (13 Mar 2020 05:51) (100% - 100%)    PHYSICAL EXAM:  GENERAL: NAD, Comfortable  HEAD:  Atraumatic, Normocephalic  EYES: EOMI, PERRLA, conjunctiva and sclera clear  NECK: Supple, No JVD  CHEST/LUNG: Clear to auscultation bilaterally; No wheeze  HEART: Regular rate and rhythm; No murmurs, rubs, or gallops  ABDOMEN: Soft, Nontender, Nondistended; Bowel sounds present  Neuro: AAO x 3, no focal deficit, 5/5 b/l extremities  EXTREMITIES:  2+ Peripheral Pulses, No clubbing, cyanosis, or edema  SKIN: No rashes or lesions      LABS:                        8.3    6.95  )-----------( 173      ( 13 Mar 2020 05:35 )             24.7     03-13    125<L>  |  87<L>  |  88<H>  ----------------------------<  96  4.2   |  22  |  12.18<H>    Ca    8.9      13 Mar 2020 05:35  Phos  4.4     03-13  Mg     2.7     03-13    TPro  6.0  /  Alb  x   /  TBili  x   /  DBili  x   /  AST  x   /  ALT  x   /  AlkPhos  x   03-13      CAPILLARY BLOOD GLUCOSE      POCT Blood Glucose.: 121 mg/dL (13 Mar 2020 08:39)  POCT Blood Glucose.: 188 mg/dL (12 Mar 2020 22:09)  POCT Blood Glucose.: 194 mg/dL (12 Mar 2020 17:09)  POCT Blood Glucose.: 282 mg/dL (12 Mar 2020 12:25)

## 2020-03-13 NOTE — PROGRESS NOTE ADULT - PROBLEM SELECTOR PLAN 1
CT A/P No contrast without acute pathology  Complement/MARGARITA nl  Weak band detected on UPEP -> serum/urine immunofixation are testing  HIV Test neg  Hep A,B,C profile neg  Hgb Electrophoresis is normal  Avoid Nephrotoxic agents, Avoid NSAID, Renal Diet, Low K+ diet  IR guided renal biopsy Monday 3/10/20 (ddavp x 1), path preliminary showing oxalate nephropathy -> now on PO prednisone trial  renal f/u (Dr. Viera)

## 2020-03-13 NOTE — DISCHARGE NOTE PROVIDER - CARE PROVIDER_API CALL
Hiram Viera)  Internal Medicine; Nephrology  [address], [address] [place] [address]  Phone: [phone]  Fax: [phone]  Follow Up Time:

## 2020-03-13 NOTE — DISCHARGE NOTE PROVIDER - NSDCMRMEDTOKEN_GEN_ALL_CORE_FT
What Is The Reason For Today's Visit?: History of Non-Melanoma Skin Cancer How Many Skin Cancers Have You Had?: one When Was Your Last Cancer Diagnosed?: 2014 metFORMIN 500 mg oral tablet: 1 tab(s) orally 2 times a day  Spiriva 18 mcg inhalation capsule: 1 cap(s) inhaled once a day  Symbicort 160 mcg-4.5 mcg/inh inhalation aerosol: 2 puff(s) inhaled 2 times a day amLODIPine 2.5 mg oral tablet: 1 tab(s) orally once a day  loratadine 10 mg oral tablet: 1 tab(s) orally once a day, As needed, Allergy  polyethylene glycol 3350 oral powder for reconstitution: 17 gram(s) orally once a day  predniSONE 20 mg oral tablet: 1 tab(s) orally once a day  senna oral tablet: 2 tab(s) orally once a day (at bedtime)  sevelamer carbonate 800 mg oral tablet: 2 tab(s) orally 3 times a day (with meals)  sodium bicarbonate 650 mg oral tablet: 1 tab(s) orally 3 times a day  sodium chloride 1 g oral tablet: 2 tab(s) orally 3 times a day  Spiriva 18 mcg inhalation capsule: 1 cap(s) inhaled once a day  Symbicort 160 mcg-4.5 mcg/inh inhalation aerosol: 2 puff(s) inhaled 2 times a day

## 2020-03-13 NOTE — CHART NOTE - NSCHARTNOTEFT_GEN_A_CORE
NUTRITION SERVICES     Upon Nutritional Assessment by the Registered Dietitian your patient was determined to meet criteria/ has evidence of the following diagnosis/diagnoses:  [ ] Mild Protein Calorie Malnutrition   [ ] Moderate Protein Calorie Malnutrition   [X] Severe Protein Calorie Malnutrition   [ ] Unspecified Protein Calorie Malnutrition   [ ] Underweight / BMI <19  [ ] Morbid Obesity / BMI >40    Findings as based on:  •  Comprehensive nutritional assessment and consultation    Please refer to Initial Dietitian Evaluation via documents section of [facility] for further recommendations.    Brent Naylor RD,CD/N,CDE

## 2020-03-13 NOTE — DISCHARGE NOTE PROVIDER - HOSPITAL COURSE
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; US b/l small kidney no hydro, b/l fullness of kidney; CT A/P - no acute pathology, no nephrolithiasis, no hyrdo    - HBV/HCV neg, C3/C4 WNL, MARGARITA/HIV/RPR Negative	    - Weak band detected on UPEP -> serum/urine immunofixation are testing    - Pending ANCA, SPEP, IF----    - S/p PRBC 3/11    - EPO 45979 sq weekly        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 placed on free water restriction <1L and salt tabs; Increased 3/13 to 2g TID        Leg pain, bilateral s/p venous Doppler legs - neg for DVT        COPD continued on Spiriva and Symbicort; CXR negative         HTN started low dose Norvasc 2.5 mg QD with improved BP         Dispo - 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; US b/l small kidney no hydro, b/l fullness of kidney; CT A/P - no acute pathology, no nephrolithiasis, no hyrdo    - HBV/HCV neg, C3/C4 WNL, MARGARITA/HIV/RPR Negative	    - Weak band detected on UPEP -> serum/urine immunofixation are testing    - Pending ANCA, SPEP, IF on discharge    - S/p PRBC 3/11    - EPO 35718 sq weekly        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

## 2020-03-13 NOTE — DIETITIAN INITIAL EVALUATION ADULT. - PROBLEM SELECTOR PLAN 1
ARF, + Anemia: Unclear etiology, Renal follow up, R/O Malignancy, CT A/P No contrast, Chest X ray, ECHO, F/U CBC, CMP, + Anemia, Iron Studies, Ferritin, Vit B12,  Folate, SPEP, UPEP, serum and Urine immunofixation, C3, C4, RF, MARGARITA, ANCA, HIV Test, Hep A,B,C profile, LDH, Hgb Electrophoresis, LDH, Uric Acid, Serum PTH intact, Vit D 1-25 Dihydroxy , Vit D 25 Hydroxy, Erythropoietin Level, Urine Creatinine, Retic Count, Serum Haptoglobin,   R/O Multiple Myeloma,   ECG,  Avoid Nephrotoxic agents, Avoid NSAID, Renal Diet, Low K+ diet,

## 2020-03-13 NOTE — DISCHARGE NOTE PROVIDER - NSDCCPCAREPLAN_GEN_ALL_CORE_FT
PRINCIPAL DISCHARGE DIAGNOSIS  Diagnosis: Acute renal failure  Assessment and Plan of Treatment: RECS ___________ **      SECONDARY DISCHARGE DIAGNOSES  Diagnosis: Anemia  Assessment and Plan of Treatment: You were started on Epogen for low blood counts. Follow-up with your outpatient provider for further monitoring of your blood counts. Monitor for signs/symptoms indicating worsening of disease, such as, easy bleeding/bruising, pale skin, fatigue, dizziness, increased heart rate, or chest pain.      Diagnosis: Hyponatremia  Assessment and Plan of Treatment: Continue with fluids restriction to 1 Liter maximum daily and continue with salt tabs as per kidney team - Follow-up as outpatient for labs within 1 week for low sodium levels    Diagnosis: Chronic obstructive pulmonary disease (COPD)  Assessment and Plan of Treatment: Please continue to use your inhalers as prescribed and follow-up with your primary care provider/pulmonologist for further care and annual pulmonary function testings. Avoid smoking or being exposed to second-hand smoke, as well as, other potenital exacerbating triggers (Dust/pollen/pets). Monitor for signs/symptoms of COPD exacerbation, such as, shortness of breath, increased sputum production, increased cough, wheezing, difficulty breathing, or fever - Report to the emergency room if symptoms are not relieved by usual regimen.    Diagnosis: HTN (hypertension)  Assessment and Plan of Treatment: Continue blood pressure medication regimen as directed. Monitor for any visual changes, headaches or dizziness.  Monitor blood pressure regularly.  Follow up with your primary care provider for further management for high blood pressure.    Diagnosis: Leg pain, bilateral  Assessment and Plan of Treatment: No blood clot noted PRINCIPAL DISCHARGE DIAGNOSIS  Diagnosis: Acute renal failure  Assessment and Plan of Treatment: You were seen at [facility] for worsening kidney function. You had a normal Kidney ultrasound, lab work that was sent so far on the day of discharge had been normal but were still awaiting results of 3 tests, and you had a kidney biopsy which showed oxalate crystal bephropaty.   You are to continue the medications as prescribed in the discharge paperwork including the prednisone 20mg until 3/25. You are also to take the sodium bicarb and the salt tabs (sodium chloride).  You are to follow-up with Dr. Viera in the next week as discussed to have you kidney function and electrolytes checked.   You are to return to the hospital for any fainting episodes, worsening trouble breathing, blood in your urine, increased swelling of your arms or legs, or any other concerns.      SECONDARY DISCHARGE DIAGNOSES  Diagnosis: Leg pain, bilateral  Assessment and Plan of Treatment: No blood clot noted    Diagnosis: Chronic obstructive pulmonary disease (COPD)  Assessment and Plan of Treatment: Please continue to use your inhalers as prescribed and follow-up with your primary care provider/pulmonologist for further care and annual pulmonary function testings. Avoid smoking or being exposed to second-hand smoke, as well as, other potenital exacerbating triggers (Dust/pollen/pets). Monitor for signs/symptoms of COPD exacerbation, such as, shortness of breath, increased sputum production, increased cough, wheezing, difficulty breathing, or fever - Report to the emergency room if symptoms are not relieved by usual regimen.    Diagnosis: HTN (hypertension)  Assessment and Plan of Treatment: Continue blood pressure medication regimen as directed. Monitor for any visual changes, headaches or dizziness.  Monitor blood pressure regularly.  Follow up with your primary care provider for further management for high blood pressure.    Diagnosis: Anemia  Assessment and Plan of Treatment: You were started on Epogen for low blood counts. Follow-up with your outpatient provider for further monitoring of your blood counts. Monitor for signs/symptoms indicating worsening of disease, such as, easy bleeding/bruising, pale skin, fatigue, dizziness, increased heart rate, or chest pain.      Diagnosis: Hyponatremia  Assessment and Plan of Treatment: Continue with fluids restriction to 1 Liter maximum daily and continue with salt tabs as per kidney team - Follow-up as outpatient for labs within 1 week for low sodium levels

## 2020-03-13 NOTE — PROGRESS NOTE ADULT - SUBJECTIVE AND OBJECTIVE BOX
[facility]   MD ABDELRAHMAN TOPETE DO ANAM SIDDIQUI ANGELA WONG, PA    TEL:  OFFICE: [phone]  DR MENDOZA CELL: [phone]  DR. SANTANA CELL: [phone]  DR. MCKEON CELL: [phone]  VALENCIA WELCH CELL: [phone]        Patient is a 75y old  Female who presents with a chief complaint of + CINDA, Acute Renal Failure, (13 Mar 2020 09:12)      Patient seen and examined at bedside. No chest pain/sob    VITALS:  T(F): 98.2 (03-13-20 @ 05:51), Max: 98.6 (03-12-20 @ 16:30)  HR: 79 (03-13-20 @ 05:51)  BP: 120/64 (03-13-20 @ 05:51)  RR: 18 (03-13-20 @ 05:51)  SpO2: 100% (03-13-20 @ 05:51)  Wt(kg): --    03-12 @ 07:01  -  03-13 @ 07:00  --------------------------------------------------------  IN: 1080 mL / OUT: 200 mL / NET: 880 mL    03-13 @ 07:01  -  03-13 @ 13:49  --------------------------------------------------------  IN: 0 mL / OUT: 600 mL / NET: -600 mL          PHYSICAL EXAM:  Constitutional: NAD  Neck: No JVD  Respiratory: CTAB, no wheezes, rales or rhonchi  Cardiovascular: S1, S2, RRR  Gastrointestinal: BS+, soft, NT/ND  Extremities: No peripheral edema    Hospital Medications:   MEDICATIONS  (STANDING):  amLODIPine   Tablet 2.5 milliGRAM(s) Oral daily  budesonide 160 MICROgram(s)/formoterol 4.5 MICROgram(s) Inhaler 2 Puff(s) Inhalation two times a day  dextrose 5%. 1000 milliLiter(s) (50 mL/Hr) IV Continuous <Continuous>  dextrose 50% Injectable 12.5 Gram(s) IV Push once  dextrose 50% Injectable 25 Gram(s) IV Push once  dextrose 50% Injectable 25 Gram(s) IV Push once  epoetin jason Injectable 64924 Unit(s) SubCutaneous <User Schedule>  heparin  Injectable 5000 Unit(s) SubCutaneous every 8 hours  influenza   Vaccine 0.5 milliLiter(s) IntraMuscular once  insulin lispro (HumaLOG) corrective regimen sliding scale   SubCutaneous at bedtime  insulin lispro (HumaLOG) corrective regimen sliding scale   SubCutaneous three times a day before meals  polyethylene glycol 3350 17 Gram(s) Oral daily  predniSONE   Tablet 20 milliGRAM(s) Oral daily  senna 2 Tablet(s) Oral at bedtime  sevelamer carbonate 1600 milliGRAM(s) Oral three times a day with meals  sodium bicarbonate 1300 milliGRAM(s) Oral three times a day  sodium chloride 2 Gram(s) Oral three times a day  tiotropium 18 MICROgram(s) Capsule 1 Capsule(s) Inhalation daily      LABS:  03-13    125<L>  |  87<L>  |  88<H>  ----------------------------<  96  4.2   |  22  |  12.18<H>    Ca    8.9      13 Mar 2020 05:35  Phos  4.4     03-13  Mg     2.7     03-13    TPro  6.0  /  Alb      /  TBili      /  DBili      /  AST      /  ALT      /  AlkPhos      03-13    Creatinine Trend: 12.18 <--, 12.30 <--, 12.67 <--, 13.18 <--, 13.15 <--, 13.41 <--, 12.71 <--, 13.17 <--    Phosphorus Level, Serum: 4.4 mg/dL (03-13 @ 05:35)                              8.3    6.95  )-----------( 173      ( 13 Mar 2020 05:35 )             24.7     Urine Studies:  Urinalysis - [03-06-20 @ 06:30]      Color COLORLESS / Appearance CLEAR / SG 1.008 / pH 6.5      Gluc 30 / Ketone NEGATIVE  / Bili NEGATIVE / Urobili NORMAL       Blood TRACE / Protein 20 / Leuk Est NEGATIVE / Nitrite NEGATIVE      RBC 3-5 / WBC 3-5 / Hyaline  / Gran  / Sq Epi  / Non Sq Epi FEW / Bacteria     Urine Protein 30.7      [03-12-20 @ 23:10]  Urine Sodium 117      [03-11-20 @ 23:00]  Urine Osmolality 399      [03-11-20 @ 23:00]    Iron 107, TIBC 252, %sat --      [03-06-20 @ 06:30]  Ferritin 152.9      [03-06-20 @ 06:30]  .9 (Ca --)      [03-06-20 @ 06:30]   --  HbA1c 6.0      [03-06-20 @ 06:30]  TSH 1.87      [03-11-20 @ 05:31]  Lipid: chol 183, , HDL 35,       [03-06-20 @ 06:30]    HBsAg Nonreactive      [03-06-20 @ 06:30]  HCV 0.10, Nonreactive Hepatitis C AB  S/CO Ratio                        Interpretation  < 1.00                                   Non-Reactive  1.00 - 4.99                         Weakly-Reactive  >= 5.00                                Reactive  Non-Reactive: Aperson with a non-reactive HCV antibody  result is considered uninfected.  No further action is  needed unless recent infection is suspected.  In these  cases, consider repeat testing later to detect  seroconversion..  Weakly-Reactive: HCV antibody test is abnormal, HCV RNA  Qualitative test will follow.  Reactive: HCV antibody test is abnormal, HCV RNA  Qualitative test will follow.  Note: HCV antibody testing is performed on the Abbott   system.      [03-07-20 @ 05:19]  HIV Nonreactive The HIV Ag/Ab Combo test performed screens for HIV-1 p24  antigen, antibodies to HIV-1 (group M and group O), and  antibodies to HIV-2. All specimens repeatedly reactive  will reflex to an HIV 1/2 antibody confirmation and  differentiation test. This assay detects p24 antigen which  may be present prior to the development of HIV antibodies,  therefore a reactive result with a negative HIV 1/2 AB  Confirmation should be followed up with HIV-1 RNA, HIV-2  RNA and repeat testing in 4-8 weeks. A nonreactive result  does not preclude previous exposure to or infection with  HIV-1 or HIV-2.      [03-08-20 @ 05:35]    MARGARITA: titer Negative, pattern --      [03-06-20 @ 06:30]  C3 Complement 91.1      [03-06-20 @ 06:30]  C4 Complement 34.2      [03-06-20 @ 06:30]  Syphilis Screen (Treponema Pallidum Ab) Negative      [03-11-20 @ 05:31]  Free Light Chains: kappa 11.21, lambda 5.69, ratio = --      [03-11 @ 05:31]    RADIOLOGY & ADDITIONAL STUDIES:

## 2020-03-13 NOTE — DIETITIAN INITIAL EVALUATION ADULT. - PERTINENT MEDS FT
MEDICATIONS  (STANDING):  amLODIPine   Tablet 2.5 milliGRAM(s) Oral daily  budesonide 160 MICROgram(s)/formoterol 4.5 MICROgram(s) Inhaler 2 Puff(s) Inhalation two times a day  dextrose 5%. 1000 milliLiter(s) (50 mL/Hr) IV Continuous <Continuous>  dextrose 50% Injectable 12.5 Gram(s) IV Push once  dextrose 50% Injectable 25 Gram(s) IV Push once  dextrose 50% Injectable 25 Gram(s) IV Push once  epoetin jason Injectable 32605 Unit(s) SubCutaneous <User Schedule>  heparin  Injectable 5000 Unit(s) SubCutaneous every 8 hours  influenza   Vaccine 0.5 milliLiter(s) IntraMuscular once  insulin lispro (HumaLOG) corrective regimen sliding scale   SubCutaneous at bedtime  insulin lispro (HumaLOG) corrective regimen sliding scale   SubCutaneous three times a day before meals  polyethylene glycol 3350 17 Gram(s) Oral daily  predniSONE   Tablet 20 milliGRAM(s) Oral daily  senna 2 Tablet(s) Oral at bedtime  sevelamer carbonate 1600 milliGRAM(s) Oral three times a day with meals  sodium bicarbonate 1300 milliGRAM(s) Oral three times a day  sodium chloride 1 Gram(s) Oral three times a day  tiotropium 18 MICROgram(s) Capsule 1 Capsule(s) Inhalation daily

## 2020-03-13 NOTE — DIETITIAN INITIAL EVALUATION ADULT. - OTHER INFO
Pt with PMHx DM type 2 on Metformin,  COPD, Anemia, HTN on no meds, OA, presenting for evaluation of abnormal blood and urine results. She reports that for the last 2 weeks she has been having weakness, fatigue, nausea, headache, loss of appetite, and abdominal/back pain. Spoke with the on call physician who reported  that her lab results today showed  Cr 14 Urine protein 300+ Hgb 9.1 HCT 28 K 6.2 and she was told to come to this ED.     Pt with extended LOS- Day 8. Pt reports persistent lack of appetite and consuming <50% of meals >5 days. No chewing, swallowing difficulty, nausea or vomiting, is experiencing heart burn and bloating. Reports one BM since admit. Discussed importance of po intakes, reviewed menu selections to consider for increased po. Pt also reports ~5# weight loss within one week prior to admission. Suggest adding Nepro Carb Steady 2x daily and liberalizing diet.

## 2020-03-13 NOTE — DISCHARGE NOTE PROVIDER - INSTRUCTIONS
*** SPECIAL DIET AS PER NEPHROLOGY *** SPECIAL DIET AS PER NEPHROLOGY    Stop all vitamin C ingestion, senna tea and limit her Pepsi ingestion

## 2020-03-14 LAB
ANION GAP SERPL CALC-SCNC: 14 MMO/L — SIGNIFICANT CHANGE UP (ref 7–14)
BUN SERPL-MCNC: 87 MG/DL — HIGH (ref 7–23)
CALCIUM SERPL-MCNC: 9 MG/DL — SIGNIFICANT CHANGE UP (ref 8.4–10.5)
CHLORIDE SERPL-SCNC: 90 MMOL/L — LOW (ref 98–107)
CO2 SERPL-SCNC: 25 MMOL/L — SIGNIFICANT CHANGE UP (ref 22–31)
CREAT SERPL-MCNC: 11.59 MG/DL — HIGH (ref 0.5–1.3)
GLUCOSE SERPL-MCNC: 97 MG/DL — SIGNIFICANT CHANGE UP (ref 70–99)
HCT VFR BLD CALC: 27 % — LOW (ref 34.5–45)
HGB BLD-MCNC: 9.2 G/DL — LOW (ref 11.5–15.5)
MAGNESIUM SERPL-MCNC: 2.6 MG/DL — SIGNIFICANT CHANGE UP (ref 1.6–2.6)
MCHC RBC-ENTMCNC: 30.7 PG — SIGNIFICANT CHANGE UP (ref 27–34)
MCHC RBC-ENTMCNC: 34.1 % — SIGNIFICANT CHANGE UP (ref 32–36)
MCV RBC AUTO: 90 FL — SIGNIFICANT CHANGE UP (ref 80–100)
NRBC # FLD: 0 K/UL — SIGNIFICANT CHANGE UP (ref 0–0)
OSMOLALITY UR: 310 MOSMO/KG — SIGNIFICANT CHANGE UP (ref 50–1200)
PHOSPHATE SERPL-MCNC: 4.4 MG/DL — SIGNIFICANT CHANGE UP (ref 2.5–4.5)
PLATELET # BLD AUTO: 221 K/UL — SIGNIFICANT CHANGE UP (ref 150–400)
PMV BLD: 10.6 FL — SIGNIFICANT CHANGE UP (ref 7–13)
POTASSIUM SERPL-MCNC: 4.1 MMOL/L — SIGNIFICANT CHANGE UP (ref 3.5–5.3)
POTASSIUM SERPL-SCNC: 4.1 MMOL/L — SIGNIFICANT CHANGE UP (ref 3.5–5.3)
RBC # BLD: 3 M/UL — LOW (ref 3.8–5.2)
RBC # FLD: 12.8 % — SIGNIFICANT CHANGE UP (ref 10.3–14.5)
SODIUM SERPL-SCNC: 129 MMOL/L — LOW (ref 135–145)
SODIUM UR-SCNC: 88 MMOL/L — SIGNIFICANT CHANGE UP
WBC # BLD: 7.71 K/UL — SIGNIFICANT CHANGE UP (ref 3.8–10.5)
WBC # FLD AUTO: 7.71 K/UL — SIGNIFICANT CHANGE UP (ref 3.8–10.5)

## 2020-03-14 RX ORDER — SODIUM BICARBONATE 1 MEQ/ML
650 SYRINGE (ML) INTRAVENOUS THREE TIMES A DAY
Refills: 0 | Status: DISCONTINUED | OUTPATIENT
Start: 2020-03-14 | End: 2020-03-16

## 2020-03-14 RX ORDER — SODIUM CHLORIDE 9 MG/ML
2 INJECTION INTRAMUSCULAR; INTRAVENOUS; SUBCUTANEOUS THREE TIMES A DAY
Refills: 0 | Status: DISCONTINUED | OUTPATIENT
Start: 2020-03-14 | End: 2020-03-16

## 2020-03-14 RX ADMIN — BUDESONIDE AND FORMOTEROL FUMARATE DIHYDRATE 2 PUFF(S): 160; 4.5 AEROSOL RESPIRATORY (INHALATION) at 10:21

## 2020-03-14 RX ADMIN — HEPARIN SODIUM 5000 UNIT(S): 5000 INJECTION INTRAVENOUS; SUBCUTANEOUS at 21:55

## 2020-03-14 RX ADMIN — SEVELAMER CARBONATE 1600 MILLIGRAM(S): 2400 POWDER, FOR SUSPENSION ORAL at 10:21

## 2020-03-14 RX ADMIN — Medication 650 MILLIGRAM(S): at 21:54

## 2020-03-14 RX ADMIN — SODIUM CHLORIDE 2 GRAM(S): 9 INJECTION INTRAMUSCULAR; INTRAVENOUS; SUBCUTANEOUS at 05:41

## 2020-03-14 RX ADMIN — SODIUM CHLORIDE 2 GRAM(S): 9 INJECTION INTRAMUSCULAR; INTRAVENOUS; SUBCUTANEOUS at 21:58

## 2020-03-14 RX ADMIN — LORATADINE 10 MILLIGRAM(S): 10 TABLET ORAL at 23:53

## 2020-03-14 RX ADMIN — TIOTROPIUM BROMIDE 1 CAPSULE(S): 18 CAPSULE ORAL; RESPIRATORY (INHALATION) at 10:36

## 2020-03-14 RX ADMIN — Medication 20 MILLIGRAM(S): at 05:41

## 2020-03-14 RX ADMIN — SEVELAMER CARBONATE 1600 MILLIGRAM(S): 2400 POWDER, FOR SUSPENSION ORAL at 12:52

## 2020-03-14 RX ADMIN — HEPARIN SODIUM 5000 UNIT(S): 5000 INJECTION INTRAVENOUS; SUBCUTANEOUS at 12:52

## 2020-03-14 RX ADMIN — HEPARIN SODIUM 5000 UNIT(S): 5000 INJECTION INTRAVENOUS; SUBCUTANEOUS at 05:41

## 2020-03-14 RX ADMIN — AMLODIPINE BESYLATE 2.5 MILLIGRAM(S): 2.5 TABLET ORAL at 05:41

## 2020-03-14 RX ADMIN — Medication 30 MILLILITER(S): at 23:53

## 2020-03-14 RX ADMIN — SENNA PLUS 2 TABLET(S): 8.6 TABLET ORAL at 21:54

## 2020-03-14 RX ADMIN — SODIUM CHLORIDE 2 GRAM(S): 9 INJECTION INTRAMUSCULAR; INTRAVENOUS; SUBCUTANEOUS at 12:54

## 2020-03-14 RX ADMIN — SEVELAMER CARBONATE 1600 MILLIGRAM(S): 2400 POWDER, FOR SUSPENSION ORAL at 17:23

## 2020-03-14 RX ADMIN — Medication 2: at 12:52

## 2020-03-14 RX ADMIN — LORATADINE 10 MILLIGRAM(S): 10 TABLET ORAL at 00:39

## 2020-03-14 RX ADMIN — Medication 1300 MILLIGRAM(S): at 12:53

## 2020-03-14 RX ADMIN — POLYETHYLENE GLYCOL 3350 17 GRAM(S): 17 POWDER, FOR SOLUTION ORAL at 12:53

## 2020-03-14 RX ADMIN — Medication 1300 MILLIGRAM(S): at 05:41

## 2020-03-14 NOTE — PROGRESS NOTE ADULT - SUBJECTIVE AND OBJECTIVE BOX
[facility] [place] [facility]   MD ABDELRAHMAN TOPETE DO ANAM SIDDIQUI ANGELA WONG, PA    TEL:  OFFICE: [phone]  DR MENDOZA CELL: [phone]  DR. SANTANA CELL: [phone]  DR. MCKEON CELL: [phone]  VALENCIA WELCH CELL: [phone]        Patient is a 75y old  Female who presents with a chief complaint of + CINDA, Acute Renal Failure, (14 Mar 2020 11:00)      Patient seen and examined at bedside. No chest pain/sob    VITALS:  T(F): 97.3 (03-14-20 @ 05:38), Max: 98.5 (03-13-20 @ 15:19)  HR: 80 (03-14-20 @ 05:38)  BP: 138/64 (03-14-20 @ 05:38)  RR: 17 (03-14-20 @ 05:38)  SpO2: 98% (03-14-20 @ 05:38)  Wt(kg): --    03-13 @ 07:01  -  03-14 @ 07:00  --------------------------------------------------------  IN: 225 mL / OUT: 1500 mL / NET: -1275 mL    03-14 @ 07:01  -  03-14 @ 13:52  --------------------------------------------------------  IN: 0 mL / OUT: 300 mL / NET: -300 mL          PHYSICAL EXAM:  Constitutional: NAD  Neck: No JVD  Respiratory: CTAB, no wheezes, rales or rhonchi  Cardiovascular: S1, S2, RRR  Gastrointestinal: BS+, soft, NT/ND  Extremities: No peripheral edema    Hospital Medications:   MEDICATIONS  (STANDING):  amLODIPine   Tablet 2.5 milliGRAM(s) Oral daily  budesonide 160 MICROgram(s)/formoterol 4.5 MICROgram(s) Inhaler 2 Puff(s) Inhalation two times a day  dextrose 5%. 1000 milliLiter(s) (50 mL/Hr) IV Continuous <Continuous>  dextrose 50% Injectable 12.5 Gram(s) IV Push once  dextrose 50% Injectable 25 Gram(s) IV Push once  dextrose 50% Injectable 25 Gram(s) IV Push once  epoetin jason Injectable 13517 Unit(s) SubCutaneous <User Schedule>  heparin  Injectable 5000 Unit(s) SubCutaneous every 8 hours  influenza   Vaccine 0.5 milliLiter(s) IntraMuscular once  insulin lispro (HumaLOG) corrective regimen sliding scale   SubCutaneous at bedtime  insulin lispro (HumaLOG) corrective regimen sliding scale   SubCutaneous three times a day before meals  polyethylene glycol 3350 17 Gram(s) Oral daily  predniSONE   Tablet 20 milliGRAM(s) Oral daily  senna 2 Tablet(s) Oral at bedtime  sevelamer carbonate 1600 milliGRAM(s) Oral three times a day with meals  sodium bicarbonate 650 milliGRAM(s) Oral three times a day  sodium chloride 2 Gram(s) Oral three times a day  tiotropium 18 MICROgram(s) Capsule 1 Capsule(s) Inhalation daily      LABS:  03-14    129<L>  |  90<L>  |  87<H>  ----------------------------<  97  4.1   |  25  |  11.59<H>    Ca    9.0      14 Mar 2020 07:20  Phos  4.4     03-14  Mg     2.6     03-14    TPro  6.0  /  Alb      /  TBili      /  DBili      /  AST      /  ALT      /  AlkPhos      03-13    Creatinine Trend: 11.59 <--, 12.15 <--, 12.18 <--, 12.30 <--, 12.67 <--, 13.18 <--, 13.15 <--, 13.41 <--, 12.71 <--    Phosphorus Level, Serum: 4.4 mg/dL (03-14 @ 07:20)  Phosphorus Level, Serum: 4.3 mg/dL (03-13 @ 16:02)                              9.2    7.71  )-----------( 221      ( 14 Mar 2020 07:20 )             27.0     Urine Studies:  Urinalysis - [03-06-20 @ 06:30]      Color COLORLESS / Appearance CLEAR / SG 1.008 / pH 6.5      Gluc 30 / Ketone NEGATIVE  / Bili NEGATIVE / Urobili NORMAL       Blood TRACE / Protein 20 / Leuk Est NEGATIVE / Nitrite NEGATIVE      RBC 3-5 / WBC 3-5 / Hyaline  / Gran  / Sq Epi  / Non Sq Epi FEW / Bacteria     Urine Protein 30.7      [03-12-20 @ 23:10]  Urine Sodium 88      [03-14-20 @ 05:43]  Urine Osmolality 310      [03-14-20 @ 05:43]    Iron 107, TIBC 252, %sat --      [03-06-20 @ 06:30]  Ferritin 152.9      [03-06-20 @ 06:30]  .9 (Ca --)      [03-06-20 @ 06:30]   --  HbA1c 6.0      [03-06-20 @ 06:30]  TSH 1.87      [03-11-20 @ 05:31]  Lipid: chol 183, , HDL 35,       [03-06-20 @ 06:30]    HBsAg Nonreactive      [03-06-20 @ 06:30]  HCV 0.10, Nonreactive Hepatitis C AB  S/CO Ratio                        Interpretation  < 1.00                                   Non-Reactive  1.00 - 4.99                         Weakly-Reactive  >= 5.00                                Reactive  Non-Reactive: Aperson with a non-reactive HCV antibody  result is considered uninfected.  No further action is  needed unless recent infection is suspected.  In these  cases, consider repeat testing later to detect  seroconversion..  Weakly-Reactive: HCV antibody test is abnormal, HCV RNA  Qualitative test will follow.  Reactive: HCV antibody test is abnormal, HCV RNA  Qualitative test will follow.  Note: HCV antibody testing is performed on the Abbott   system.      [03-07-20 @ 05:19]  HIV Nonreactive The HIV Ag/Ab Combo test performed screens for HIV-1 p24  antigen, antibodies to HIV-1 (group M and group O), and  antibodies to HIV-2. All specimens repeatedly reactive  will reflex to an HIV 1/2 antibody confirmation and  differentiation test. This assay detects p24 antigen which  may be present prior to the development of HIV antibodies,  therefore a reactive result with a negative HIV 1/2 AB  Confirmation should be followed up with HIV-1 RNA, HIV-2  RNA and repeat testing in 4-8 weeks. A nonreactive result  does not preclude previous exposure to or infection with  HIV-1 or HIV-2.      [03-08-20 @ 05:35]    MARGARITA: titer Negative, pattern --      [03-06-20 @ 06:30]  C3 Complement 91.1      [03-06-20 @ 06:30]  C4 Complement 34.2      [03-06-20 @ 06:30]  Syphilis Screen (Treponema Pallidum Ab) Negative      [03-11-20 @ 05:31]  Free Light Chains: kappa 11.21, lambda 5.69, ratio = 1.97 H      [03-11 @ 05:31]    RADIOLOGY & ADDITIONAL STUDIES:

## 2020-03-14 NOTE — PROGRESS NOTE ADULT - ASSESSMENT
75 year old woman with a PMHx DM type 2 on Metformin,  COPD, Anemia, HTN on no meds, OA, sent by pcp for acute renal failure    CINDA on ckd  per note scr 1.4 in aug 2019  now admitted [facility] scr 14.04   ? etiology of CINDA  renal us showed b/l small kidney no hydro, b/l fullness of kidney,   Ct A/P with no hydroneprosis  UA with mild protein  has symptoms of uremia   s/p kidney biopsy, 3/10, prelim report showed oxlate nephropathy with minimal AIN/ATN inflammation   Advised pt to stop all vitamin C ingestion, senna tea and limit her pepsi ingestion  started on prednisone 20mg daily x7 days (from 3/11)  renal function improving  monitor bmp. avoid nephrotoxic agents    renal function and Na improving, stable from renal stand point for discharge. continue Na tab 2gm tid, sodium bicarb 650mg tid, prednisone 20mg daily until 3/18  pt to follow up with dr. barakat in 1 wk    Proteinuria  mild however in view of worsen renal function will get vasculitis work up  Vasculitis w/u for proteinuria  hep B and Hep C neg, C3, C4 not low, leann neg, hiv neg, rpr neg, K/L <2, spep, sif neg  upep with weak band however spep sif neg  Pending anca    Acidosis  AG   likely sec to renal failure  improving, decrease bicarb 650 tid  monitor serum Co2    Anemia  possible sec to chronic   monitor hb  epo 66603 sq weekly  transfuse 1unit  s/p kidney biopsy, no tenderness, no hematuria  check CT A/P without contrast if any symptoms    HTN  not on meds  controlled  monitor    CKD-MBD  elevated PTH  Hyperphosphatemia: slightly improving. continue binders TID, low PO4 diet   monitor phos and calcium daily    Hyponatremia  better on Na 2g tid  urine work up suggested SIADH  free water restiction <1L/day, pt educated  TSH and cortisol level ok  monitor

## 2020-03-14 NOTE — PROGRESS NOTE ADULT - SUBJECTIVE AND OBJECTIVE BOX
denies SOB or CP  no n/v/d.   kidney biopsy preliminarily showing oxalate nephropathy, on prednisone  on salt tabs for SIADH-induced hyponatremia    Vital Signs Last 24 Hrs  T(C): 36.3 (14 Mar 2020 05:38), Max: 36.9 (13 Mar 2020 15:19)  T(F): 97.3 (14 Mar 2020 05:38), Max: 98.5 (13 Mar 2020 15:19)  HR: 80 (14 Mar 2020 05:38) (75 - 88)  BP: 138/64 (14 Mar 2020 05:38) (121/58 - 138/64)  BP(mean): --  RR: 17 (14 Mar 2020 05:38) (16 - 17)  SpO2: 98% (14 Mar 2020 05:38) (98% - 100%)    PHYSICAL EXAM:  GENERAL: NAD, Comfortable  HEAD:  Atraumatic, Normocephalic  EYES: EOMI, PERRLA, conjunctiva and sclera clear  NECK: Supple, No JVD  CHEST/LUNG: Clear to auscultation bilaterally; No wheeze  HEART: Regular rate and rhythm; No murmurs, rubs, or gallops  ABDOMEN: Soft, Nontender, Nondistended; Bowel sounds present  Neuro: AAO x 3, no focal deficit, 5/5 b/l extremities  EXTREMITIES:  2+ Peripheral Pulses, No clubbing, cyanosis, or edema  SKIN: No rashes or lesions      LABS:                        9.2    7.71  )-----------( 221      ( 14 Mar 2020 07:20 )             27.0     03-14    129<L>  |  90<L>  |  87<H>  ----------------------------<  97  4.1   |  25  |  11.59<H>    Ca    9.0      14 Mar 2020 07:20  Phos  4.4     03-14  Mg     2.6     03-14    TPro  6.0  /  Alb  x   /  TBili  x   /  DBili  x   /  AST  x   /  ALT  x   /  AlkPhos  x   03-13      CAPILLARY BLOOD GLUCOSE      POCT Blood Glucose.: 113 mg/dL (14 Mar 2020 08:32)  POCT Blood Glucose.: 161 mg/dL (13 Mar 2020 22:23)  POCT Blood Glucose.: 207 mg/dL (13 Mar 2020 16:59)  POCT Blood Glucose.: 207 mg/dL (13 Mar 2020 12:21)

## 2020-03-15 LAB
ANION GAP SERPL CALC-SCNC: 16 MMO/L — HIGH (ref 7–14)
BUN SERPL-MCNC: 91 MG/DL — HIGH (ref 7–23)
CALCIUM SERPL-MCNC: 9 MG/DL — SIGNIFICANT CHANGE UP (ref 8.4–10.5)
CHLORIDE SERPL-SCNC: 91 MMOL/L — LOW (ref 98–107)
CO2 SERPL-SCNC: 23 MMOL/L — SIGNIFICANT CHANGE UP (ref 22–31)
CREAT SERPL-MCNC: 11.6 MG/DL — HIGH (ref 0.5–1.3)
GLUCOSE SERPL-MCNC: 88 MG/DL — SIGNIFICANT CHANGE UP (ref 70–99)
HCT VFR BLD CALC: 27.7 % — LOW (ref 34.5–45)
HGB BLD-MCNC: 9.3 G/DL — LOW (ref 11.5–15.5)
MAGNESIUM SERPL-MCNC: 2.7 MG/DL — HIGH (ref 1.6–2.6)
MCHC RBC-ENTMCNC: 30.4 PG — SIGNIFICANT CHANGE UP (ref 27–34)
MCHC RBC-ENTMCNC: 33.6 % — SIGNIFICANT CHANGE UP (ref 32–36)
MCV RBC AUTO: 90.5 FL — SIGNIFICANT CHANGE UP (ref 80–100)
NRBC # FLD: 0 K/UL — SIGNIFICANT CHANGE UP (ref 0–0)
PHOSPHATE SERPL-MCNC: 4.1 MG/DL — SIGNIFICANT CHANGE UP (ref 2.5–4.5)
PLATELET # BLD AUTO: 246 K/UL — SIGNIFICANT CHANGE UP (ref 150–400)
PMV BLD: 10 FL — SIGNIFICANT CHANGE UP (ref 7–13)
POTASSIUM SERPL-MCNC: 4.2 MMOL/L — SIGNIFICANT CHANGE UP (ref 3.5–5.3)
POTASSIUM SERPL-SCNC: 4.2 MMOL/L — SIGNIFICANT CHANGE UP (ref 3.5–5.3)
RBC # BLD: 3.06 M/UL — LOW (ref 3.8–5.2)
RBC # FLD: 13 % — SIGNIFICANT CHANGE UP (ref 10.3–14.5)
SODIUM SERPL-SCNC: 130 MMOL/L — LOW (ref 135–145)
WBC # BLD: 8.53 K/UL — SIGNIFICANT CHANGE UP (ref 3.8–10.5)
WBC # FLD AUTO: 8.53 K/UL — SIGNIFICANT CHANGE UP (ref 3.8–10.5)

## 2020-03-15 RX ADMIN — HEPARIN SODIUM 5000 UNIT(S): 5000 INJECTION INTRAVENOUS; SUBCUTANEOUS at 21:22

## 2020-03-15 RX ADMIN — SODIUM CHLORIDE 2 GRAM(S): 9 INJECTION INTRAMUSCULAR; INTRAVENOUS; SUBCUTANEOUS at 12:41

## 2020-03-15 RX ADMIN — SENNA PLUS 2 TABLET(S): 8.6 TABLET ORAL at 21:22

## 2020-03-15 RX ADMIN — Medication 30 MILLILITER(S): at 23:51

## 2020-03-15 RX ADMIN — Medication 650 MILLIGRAM(S): at 21:22

## 2020-03-15 RX ADMIN — AMLODIPINE BESYLATE 2.5 MILLIGRAM(S): 2.5 TABLET ORAL at 05:45

## 2020-03-15 RX ADMIN — LORATADINE 10 MILLIGRAM(S): 10 TABLET ORAL at 21:22

## 2020-03-15 RX ADMIN — SEVELAMER CARBONATE 1600 MILLIGRAM(S): 2400 POWDER, FOR SUSPENSION ORAL at 17:33

## 2020-03-15 RX ADMIN — POLYETHYLENE GLYCOL 3350 17 GRAM(S): 17 POWDER, FOR SOLUTION ORAL at 12:39

## 2020-03-15 RX ADMIN — TIOTROPIUM BROMIDE 1 CAPSULE(S): 18 CAPSULE ORAL; RESPIRATORY (INHALATION) at 08:57

## 2020-03-15 RX ADMIN — SEVELAMER CARBONATE 1600 MILLIGRAM(S): 2400 POWDER, FOR SUSPENSION ORAL at 12:39

## 2020-03-15 RX ADMIN — HEPARIN SODIUM 5000 UNIT(S): 5000 INJECTION INTRAVENOUS; SUBCUTANEOUS at 05:44

## 2020-03-15 RX ADMIN — Medication 650 MILLIGRAM(S): at 12:40

## 2020-03-15 RX ADMIN — SODIUM CHLORIDE 2 GRAM(S): 9 INJECTION INTRAMUSCULAR; INTRAVENOUS; SUBCUTANEOUS at 21:22

## 2020-03-15 RX ADMIN — Medication 20 MILLIGRAM(S): at 05:44

## 2020-03-15 RX ADMIN — SEVELAMER CARBONATE 1600 MILLIGRAM(S): 2400 POWDER, FOR SUSPENSION ORAL at 08:56

## 2020-03-15 RX ADMIN — BUDESONIDE AND FORMOTEROL FUMARATE DIHYDRATE 2 PUFF(S): 160; 4.5 AEROSOL RESPIRATORY (INHALATION) at 08:56

## 2020-03-15 RX ADMIN — HEPARIN SODIUM 5000 UNIT(S): 5000 INJECTION INTRAVENOUS; SUBCUTANEOUS at 12:40

## 2020-03-15 RX ADMIN — Medication 650 MILLIGRAM(S): at 05:44

## 2020-03-15 RX ADMIN — SODIUM CHLORIDE 2 GRAM(S): 9 INJECTION INTRAMUSCULAR; INTRAVENOUS; SUBCUTANEOUS at 05:44

## 2020-03-15 NOTE — PROGRESS NOTE ADULT - SUBJECTIVE AND OBJECTIVE BOX
denies SOB or CP  no n/v/d.   no acut epain    Vital Signs Last 24 Hrs  T(C): 36.8 (15 Mar 2020 05:41), Max: 36.8 (14 Mar 2020 14:42)  T(F): 98.2 (15 Mar 2020 05:41), Max: 98.2 (14 Mar 2020 14:42)  HR: 84 (15 Mar 2020 05:41) (84 - 90)  BP: 131/70 (15 Mar 2020 05:41) (131/70 - 150/78)  BP(mean): --  RR: 18 (15 Mar 2020 05:41) (17 - 18)  SpO2: 100% (15 Mar 2020 05:41) (100% - 100%)    PHYSICAL EXAM:  GENERAL: NAD, Comfortable  HEAD:  Atraumatic, Normocephalic  EYES: EOMI, PERRLA, conjunctiva and sclera clear  NECK: Supple, No JVD  CHEST/LUNG: Clear to auscultation bilaterally; No wheeze  HEART: Regular rate and rhythm; No murmurs, rubs, or gallops  ABDOMEN: Soft, Nontender, Nondistended; Bowel sounds present  Neuro: AAO x 3, no focal deficit, 5/5 b/l extremities  EXTREMITIES:  2+ Peripheral Pulses, No clubbing, cyanosis, or edema  SKIN: No rashes or lesions      LABS:                        9.3    8.53  )-----------( 246      ( 15 Mar 2020 06:43 )             27.7     03-15    130<L>  |  91<L>  |  91<H>  ----------------------------<  88  4.2   |  23  |  11.60<H>    Ca    9.0      15 Mar 2020 06:43  Phos  4.1     03-15  Mg     2.7     03-15        CAPILLARY BLOOD GLUCOSE      POCT Blood Glucose.: 109 mg/dL (15 Mar 2020 08:15)  POCT Blood Glucose.: 168 mg/dL (14 Mar 2020 22:17)  POCT Blood Glucose.: 153 mg/dL (14 Mar 2020 17:02)  POCT Blood Glucose.: 350 mg/dL (14 Mar 2020 12:22)

## 2020-03-15 NOTE — PROGRESS NOTE ADULT - ASSESSMENT
75 year old woman with a PMHx DM type 2 on Metformin,  COPD, Anemia, HTN on no meds, OA, sent by pcp for acute renal failure    CINDA on ckd  per note scr 1.4 in aug 2019  now admitted [facility] scr 14.04   ? etiology of CINDA.  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 avoid nephrotoxic agents    renal function and Na improving, stable from renal stand point for discharge. continue Na tab 2gm tid, sodium bicarb 650mg tid, prednisone 20mg daily until 3/25  pt to follow up with dr. barakat in 1 wk after discharge    Proteinuria  mild however in view of worsen renal function will get vasculitis work up  Vasculitis w/u for proteinuria  hep B and Hep C neg, C3, C4 not low, leann neg, hiv neg, rpr neg, K/L <2, spep, sif neg  upep with weak band however spep sif neg  Pending anca    Acidosis  AG   likely sec to renal failure  improving, decrease bicarb 650 tid  monitor serum Co2    Anemia  possible sec to chronic   monitor hb  epo 91414 sq weekly  s/p kidney biopsy, no tenderness, no hematuria  check CT A/P without contrast if any symptoms    HTN  not on meds  controlled  monitor    CKD-MBD  elevated PTH  Hyperphosphatemia: slightly improving. continue binders TID, low PO4 diet   monitor phos and calcium daily    Hyponatremia  better on Na 2g tid  urine work up suggested SIADH  free water restiction <1L/day, pt educated  TSH and cortisol level ok  monitor 75 year old woman with a PMHx DM type 2 on Metformin,  COPD, Anemia, HTN on no meds, OA, sent by pcp for acute renal failure    CINDA on ckd  per note scr 1.4 in aug 2019  now admitted [facility] scr 14.04   ? etiology of CINDA.  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 avoid nephrotoxic agents    renal function and Na improving, stable from renal stand point for discharge. continue Na tab 2gm tid, sodium bicarb 650mg tid, prednisone 20mg daily until 3/25  pt to follow up with dr. barakat in 1 wk after discharge    Proteinuria  mild   Vasculitis w/u for proteinuria  hep B and Hep C neg, C3, C4 not low, leann neg, hiv neg, rpr neg, K/L <2, spep, sif neg  upep with weak band however spep sif neg  Pending anca    Acidosis  AG   likely sec to renal failure  improving, decrease bicarb 650 tid  monitor serum Co2    Anemia  possible sec to chronic   monitor hb  epo 96610 sq weekly  s/p kidney biopsy, no tenderness, no hematuria  check CT A/P without contrast if any symptoms    HTN  not on meds  controlled  monitor    CKD-MBD  elevated PTH  Hyperphosphatemia: slightly improving. continue binders TID, low PO4 diet   monitor phos and calcium daily    Hyponatremia  better on Na 2g tid  urine work up suggested SIADH  free water restiction <1L/day, pt educated  TSH and cortisol level ok  monitor

## 2020-03-15 NOTE — PROGRESS NOTE ADULT - SUBJECTIVE AND OBJECTIVE BOX
Patient seen and examined at bedside. No chest pain/sob    VITALS:  T(F): 98.2 (03-15-20 @ 05:41), Max: 98.2 (03-15-20 @ 05:41)  HR: 84 (03-15-20 @ 05:41)  BP: 131/70 (03-15-20 @ 05:41)  RR: 18 (03-15-20 @ 05:41)  SpO2: 100% (03-15-20 @ 05:41)  Wt(kg): --    03-14 @ 07:01  -  03-15 @ 07:00  --------------------------------------------------------  IN: 0 mL / OUT: 1010 mL / NET: -1010 mL          PHYSICAL EXAM:  Constitutional: NAD  Neck: No JVD  Respiratory: CTAB, no wheezes, rales or rhonchi  Cardiovascular: S1, S2, RRR  Gastrointestinal: BS+, soft, NT/ND  Extremities: No peripheral edema    Hospital Medications:   MEDICATIONS  (STANDING):  amLODIPine   Tablet 2.5 milliGRAM(s) Oral daily  budesonide 160 MICROgram(s)/formoterol 4.5 MICROgram(s) Inhaler 2 Puff(s) Inhalation two times a day  dextrose 5%. 1000 milliLiter(s) (50 mL/Hr) IV Continuous <Continuous>  dextrose 50% Injectable 12.5 Gram(s) IV Push once  dextrose 50% Injectable 25 Gram(s) IV Push once  dextrose 50% Injectable 25 Gram(s) IV Push once  epoetin jason Injectable 59025 Unit(s) SubCutaneous <User Schedule>  heparin  Injectable 5000 Unit(s) SubCutaneous every 8 hours  influenza   Vaccine 0.5 milliLiter(s) IntraMuscular once  insulin lispro (HumaLOG) corrective regimen sliding scale   SubCutaneous at bedtime  insulin lispro (HumaLOG) corrective regimen sliding scale   SubCutaneous three times a day before meals  polyethylene glycol 3350 17 Gram(s) Oral daily  predniSONE   Tablet 20 milliGRAM(s) Oral daily  senna 2 Tablet(s) Oral at bedtime  sevelamer carbonate 1600 milliGRAM(s) Oral three times a day with meals  sodium bicarbonate 650 milliGRAM(s) Oral three times a day  sodium chloride 2 Gram(s) Oral three times a day  tiotropium 18 MICROgram(s) Capsule 1 Capsule(s) Inhalation daily      LABS:  03-15    130<L>  |  91<L>  |  91<H>  ----------------------------<  88  4.2   |  23  |  11.60<H>    Ca    9.0      15 Mar 2020 06:43  Phos  4.1     03-15  Mg     2.7     03-15      Creatinine Trend: 11.60 <--, 11.59 <--, 12.15 <--, 12.18 <--, 12.30 <--, 12.67 <--, 13.18 <--, 13.15 <--, 13.41 <--  Phosphorus Level, Serum: 4.1 mg/dL (03-15 @ 06:43)                              9.3    8.53  )-----------( 246      ( 15 Mar 2020 06:43 )             27.7     Urine Studies:  Osmolality, Random Urine: 310 mosmo/kg (03-14 @ 05:43)  Sodium, Random Urine: 88 mmol/L (03-14 @ 05:43)  Protein, Random Urine: 30.7 mg/dL (03-12 @ 23:10)  Sodium, Random Urine: 117 mmol/L (03-11 @ 23:00)  Osmolality, Random Urine: 399 mosmo/kg (03-11 @ 23:00)  Osmolality, Random Urine: 263 mosmo/kg (03-10 @ 18:30)  Sodium, Random Urine: 61 mmol/L (03-10 @ 18:30)    Osmolality, Random Urine: 310 mosmo/kg (03-14 @ 05:43)  Sodium, Random Urine: 88 mmol/L (03-14 @ 05:43)  Protein, Random Urine: 30.7 mg/dL (03-12 @ 23:10)  Sodium, Random Urine: 117 mmol/L (03-11 @ 23:00)  Osmolality, Random Urine: 399 mosmo/kg (03-11 @ 23:00)  Osmolality, Random Urine: 263 mosmo/kg (03-10 @ 18:30)  Sodium, Random Urine: 61 mmol/L (03-10 @ 18:30)    Urinalysis - [03-06-20 @ 06:30]      Color COLORLESS / Appearance CLEAR / SG 1.008 / pH 6.5      Gluc 30 / Ketone NEGATIVE  / Bili NEGATIVE / Urobili NORMAL       Blood TRACE / Protein 20 / Leuk Est NEGATIVE / Nitrite NEGATIVE      RBC 3-5 / WBC 3-5 / Hyaline  / Gran  / Sq Epi  / Non Sq Epi FEW / Bacteria     Urine Protein 30.7      [03-12-20 @ 23:10]  Urine Sodium 88      [03-14-20 @ 05:43]  Urine Osmolality 310      [03-14-20 @ 05:43]    Iron 107, TIBC 252, %sat --      [03-06-20 @ 06:30]  Ferritin 152.9      [03-06-20 @ 06:30]  .9 (Ca --)      [03-06-20 @ 06:30]   --  HbA1c 6.0      [03-06-20 @ 06:30]  TSH 1.87      [03-11-20 @ 05:31]  Lipid: chol 183, , HDL 35,       [03-06-20 @ 06:30]    HBsAg Nonreactive      [03-06-20 @ 06:30]  HCV 0.10, Nonreactive Hepatitis C AB  S/CO Ratio                        Interpretation  < 1.00                                   Non-Reactive  1.00 - 4.99                         Weakly-Reactive  >= 5.00                                Reactive  Non-Reactive: Aperson with a non-reactive HCV antibody  result is considered uninfected.  No further action is  needed unless recent infection is suspected.  In these  cases, consider repeat testing later to detect  seroconversion..  Weakly-Reactive: HCV antibody test is abnormal, HCV RNA  Qualitative test will follow.  Reactive: HCV antibody test is abnormal, HCV RNA  Qualitative test will follow.  Note: HCV antibody testing is performed on the [employer]   system.      [03-07-20 @ 05:19]  HIV Nonreactive The HIV Ag/Ab Combo test performed screens for HIV-1 p24  antigen, antibodies to HIV-1 (group M and group O), and  antibodies to HIV-2. All specimens repeatedly reactive  will reflex to an HIV 1/2 antibody confirmation and  differentiation test. This assay detects p24 antigen which  may be present prior to the development of HIV antibodies,  therefore a reactive result with a negative HIV 1/2 AB  Confirmation should be followed up with HIV-1 RNA, HIV-2  RNA and repeat testing in 4-8 weeks. A nonreactive result  does not preclude previous exposure to or infection with  HIV-1 or HIV-2.      [03-08-20 @ 05:35]    MARGARITA: titer Negative, pattern --      [03-06-20 @ 06:30]  C3 Complement 91.1      [03-06-20 @ 06:30]  C4 Complement 34.2      [03-06-20 @ 06:30]  Syphilis Screen (Treponema Pallidum Ab) Negative      [03-11-20 @ 05:31]  Free Light Chains: kappa 11.21, lambda 5.69, ratio = 1.97 H      [03-11 @ 05:31]    RADIOLOGY & ADDITIONAL STUDIES:

## 2020-03-16 ENCOUNTER — TRANSCRIPTION ENCOUNTER (OUTPATIENT)
Age: 75
End: 2020-03-16

## 2020-03-16 VITALS
SYSTOLIC BLOOD PRESSURE: 140 MMHG | DIASTOLIC BLOOD PRESSURE: 75 MMHG | TEMPERATURE: 98 F | OXYGEN SATURATION: 100 % | RESPIRATION RATE: 18 BRPM | HEART RATE: 85 BPM

## 2020-03-16 LAB
ANION GAP SERPL CALC-SCNC: 16 MMO/L — HIGH (ref 7–14)
BUN SERPL-MCNC: 91 MG/DL — HIGH (ref 7–23)
CALCIUM SERPL-MCNC: 8.7 MG/DL — SIGNIFICANT CHANGE UP (ref 8.4–10.5)
CHLORIDE SERPL-SCNC: 90 MMOL/L — LOW (ref 98–107)
CO2 SERPL-SCNC: 23 MMOL/L — SIGNIFICANT CHANGE UP (ref 22–31)
CREAT SERPL-MCNC: 11.43 MG/DL — HIGH (ref 0.5–1.3)
GLUCOSE SERPL-MCNC: 117 MG/DL — HIGH (ref 70–99)
HCT VFR BLD CALC: 26 % — LOW (ref 34.5–45)
HGB BLD-MCNC: 8.6 G/DL — LOW (ref 11.5–15.5)
MAGNESIUM SERPL-MCNC: 2.8 MG/DL — HIGH (ref 1.6–2.6)
MCHC RBC-ENTMCNC: 30.1 PG — SIGNIFICANT CHANGE UP (ref 27–34)
MCHC RBC-ENTMCNC: 33.1 % — SIGNIFICANT CHANGE UP (ref 32–36)
MCV RBC AUTO: 90.9 FL — SIGNIFICANT CHANGE UP (ref 80–100)
NRBC # FLD: 0 K/UL — SIGNIFICANT CHANGE UP (ref 0–0)
PHOSPHATE SERPL-MCNC: 4 MG/DL — SIGNIFICANT CHANGE UP (ref 2.5–4.5)
PLATELET # BLD AUTO: 230 K/UL — SIGNIFICANT CHANGE UP (ref 150–400)
PMV BLD: 9.7 FL — SIGNIFICANT CHANGE UP (ref 7–13)
POTASSIUM SERPL-MCNC: 4.4 MMOL/L — SIGNIFICANT CHANGE UP (ref 3.5–5.3)
POTASSIUM SERPL-SCNC: 4.4 MMOL/L — SIGNIFICANT CHANGE UP (ref 3.5–5.3)
RBC # BLD: 2.86 M/UL — LOW (ref 3.8–5.2)
RBC # FLD: 13 % — SIGNIFICANT CHANGE UP (ref 10.3–14.5)
SODIUM SERPL-SCNC: 129 MMOL/L — LOW (ref 135–145)
WBC # BLD: 6.9 K/UL — SIGNIFICANT CHANGE UP (ref 3.8–10.5)
WBC # FLD AUTO: 6.9 K/UL — SIGNIFICANT CHANGE UP (ref 3.8–10.5)

## 2020-03-16 PROCEDURE — 99238 HOSP IP/OBS DSCHRG MGMT 30/<: CPT

## 2020-03-16 RX ORDER — AMLODIPINE BESYLATE 2.5 MG/1
1 TABLET ORAL
Qty: 30 | Refills: 0
Start: 2020-03-16 | End: 2020-04-14

## 2020-03-16 RX ORDER — SENNA PLUS 8.6 MG/1
2 TABLET ORAL
Qty: 60 | Refills: 0
Start: 2020-03-16 | End: 2020-04-14

## 2020-03-16 RX ORDER — SODIUM BICARBONATE 1 MEQ/ML
1 SYRINGE (ML) INTRAVENOUS
Qty: 90 | Refills: 0
Start: 2020-03-16 | End: 2020-04-14

## 2020-03-16 RX ORDER — SODIUM CHLORIDE 9 MG/ML
2 INJECTION INTRAMUSCULAR; INTRAVENOUS; SUBCUTANEOUS
Qty: 180 | Refills: 0
Start: 2020-03-16 | End: 2020-04-14

## 2020-03-16 RX ORDER — SEVELAMER CARBONATE 2400 MG/1
2 POWDER, FOR SUSPENSION ORAL
Qty: 180 | Refills: 0
Start: 2020-03-16 | End: 2020-04-14

## 2020-03-16 RX ORDER — LORATADINE 10 MG/1
1 TABLET ORAL
Qty: 30 | Refills: 0
Start: 2020-03-16 | End: 2020-04-14

## 2020-03-16 RX ORDER — METFORMIN HYDROCHLORIDE 850 MG/1
1 TABLET ORAL
Qty: 0 | Refills: 0 | DISCHARGE

## 2020-03-16 RX ORDER — POLYETHYLENE GLYCOL 3350 17 G/17G
17 POWDER, FOR SOLUTION ORAL
Qty: 510 | Refills: 0
Start: 2020-03-16 | End: 2020-04-14

## 2020-03-16 RX ADMIN — Medication 650 MILLIGRAM(S): at 13:09

## 2020-03-16 RX ADMIN — BUDESONIDE AND FORMOTEROL FUMARATE DIHYDRATE 2 PUFF(S): 160; 4.5 AEROSOL RESPIRATORY (INHALATION) at 09:03

## 2020-03-16 RX ADMIN — HEPARIN SODIUM 5000 UNIT(S): 5000 INJECTION INTRAVENOUS; SUBCUTANEOUS at 13:09

## 2020-03-16 RX ADMIN — POLYETHYLENE GLYCOL 3350 17 GRAM(S): 17 POWDER, FOR SOLUTION ORAL at 13:09

## 2020-03-16 RX ADMIN — TIOTROPIUM BROMIDE 1 CAPSULE(S): 18 CAPSULE ORAL; RESPIRATORY (INHALATION) at 09:07

## 2020-03-16 RX ADMIN — SEVELAMER CARBONATE 1600 MILLIGRAM(S): 2400 POWDER, FOR SUSPENSION ORAL at 13:09

## 2020-03-16 RX ADMIN — Medication 5 MILLIGRAM(S): at 17:03

## 2020-03-16 RX ADMIN — HEPARIN SODIUM 5000 UNIT(S): 5000 INJECTION INTRAVENOUS; SUBCUTANEOUS at 05:20

## 2020-03-16 RX ADMIN — Medication 20 MILLIGRAM(S): at 05:20

## 2020-03-16 RX ADMIN — SEVELAMER CARBONATE 1600 MILLIGRAM(S): 2400 POWDER, FOR SUSPENSION ORAL at 09:02

## 2020-03-16 RX ADMIN — Medication 650 MILLIGRAM(S): at 05:20

## 2020-03-16 RX ADMIN — SODIUM CHLORIDE 2 GRAM(S): 9 INJECTION INTRAMUSCULAR; INTRAVENOUS; SUBCUTANEOUS at 05:20

## 2020-03-16 RX ADMIN — SODIUM CHLORIDE 2 GRAM(S): 9 INJECTION INTRAMUSCULAR; INTRAVENOUS; SUBCUTANEOUS at 13:10

## 2020-03-16 RX ADMIN — SEVELAMER CARBONATE 1600 MILLIGRAM(S): 2400 POWDER, FOR SUSPENSION ORAL at 17:03

## 2020-03-16 RX ADMIN — AMLODIPINE BESYLATE 2.5 MILLIGRAM(S): 2.5 TABLET ORAL at 05:20

## 2020-03-16 RX ADMIN — ERYTHROPOIETIN 10000 UNIT(S): 10000 INJECTION, SOLUTION INTRAVENOUS; SUBCUTANEOUS at 17:57

## 2020-03-16 NOTE — PROGRESS NOTE ADULT - REASON FOR ADMISSION
+ CINDA, Acute Renal Failure,

## 2020-03-16 NOTE — PROGRESS NOTE ADULT - ASSESSMENT
75 year old woman with a PMHx DM type 2 on Metformin,  COPD, Anemia, HTN on no meds, OA, sent by pcp for acute renal failure    CINDA on ckd  per note scr 1.4 in aug 2019  now admitted [facility] scr 14.04   ? 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 avoid nephrotoxic agents    renal function and Na stable.  stable from renal stand point for discharge. continue Na tab 2gm tid, sodium bicarb 650mg tid, prednisone 20mg daily until 3/24  pt to follow up with dr. barakat in 1 wk    Proteinuria  mild however in view of worsen renal function will get vasculitis work up  Vasculitis w/u for proteinuria  hep B and Hep C neg, C3, C4 not low, leann neg, hiv neg, rpr neg, K/L <2, spep, sif neg  upep with weak band however spep sif neg  Pending anca    Acidosis  AG   likely sec to renal failure  improving, decrease bicarb 650 tid  monitor serum Co2    Anemia  possible sec to chronic   monitor hb  epo 15304 sq weekly  transfuse 1unit  s/p kidney biopsy, no tenderness, no hematuria  check CT A/P without contrast if any symptoms    HTN  not on meds  controlled  monitor    CKD-MBD  elevated PTH  Hyperphosphatemia: slightly improving. continue binders TID, low PO4 diet   monitor phos and calcium daily    Hyponatremia  better on Na 2g tid  urine work up suggested SIADH  free water restiction <1L/day, pt educated  TSH and cortisol level ok  monitor

## 2020-03-16 NOTE — PROGRESS NOTE ADULT - SUBJECTIVE AND OBJECTIVE BOX
[facility]   MD ABDELRAHMAN TOPETE DO ANAM SIDDIQUI ANGELA WONG, PA    TEL:  OFFICE: [phone]  DR MENDOZA CELL: [phone]  DR. SANTANA CELL: [phone]  DR. MCKEON CELL: [phone]  VALENCIA WELCH CELL: [phone]        Patient is a 75y old  Female who presents with a chief complaint of + CINDA, Acute Renal Failure, (15 Mar 2020 14:49)      Patient seen and examined at bedside. No chest pain/sob    VITALS:  T(F): 98.5 (03-16-20 @ 05:15), Max: 98.5 (03-16-20 @ 05:15)  HR: 87 (03-16-20 @ 05:15)  BP: 139/77 (03-16-20 @ 05:15)  RR: 18 (03-16-20 @ 05:15)  SpO2: 100% (03-16-20 @ 05:15)  Wt(kg): --    03-15 @ 07:01  -  03-16 @ 07:00  --------------------------------------------------------  IN: 0 mL / OUT: 635 mL / NET: -635 mL          PHYSICAL EXAM:  Constitutional: NAD  Neck: No JVD  Respiratory: CTAB, no wheezes, rales or rhonchi  Cardiovascular: S1, S2, RRR  Gastrointestinal: BS+, soft, NT/ND  Extremities: No peripheral edema    Hospital Medications:   MEDICATIONS  (STANDING):  amLODIPine   Tablet 2.5 milliGRAM(s) Oral daily  budesonide 160 MICROgram(s)/formoterol 4.5 MICROgram(s) Inhaler 2 Puff(s) Inhalation two times a day  dextrose 5%. 1000 milliLiter(s) (50 mL/Hr) IV Continuous <Continuous>  dextrose 50% Injectable 12.5 Gram(s) IV Push once  dextrose 50% Injectable 25 Gram(s) IV Push once  dextrose 50% Injectable 25 Gram(s) IV Push once  epoetin jason Injectable 70597 Unit(s) SubCutaneous <User Schedule>  heparin  Injectable 5000 Unit(s) SubCutaneous every 8 hours  influenza   Vaccine 0.5 milliLiter(s) IntraMuscular once  insulin lispro (HumaLOG) corrective regimen sliding scale   SubCutaneous at bedtime  insulin lispro (HumaLOG) corrective regimen sliding scale   SubCutaneous three times a day before meals  polyethylene glycol 3350 17 Gram(s) Oral daily  predniSONE   Tablet 20 milliGRAM(s) Oral daily  senna 2 Tablet(s) Oral at bedtime  sevelamer carbonate 1600 milliGRAM(s) Oral three times a day with meals  sodium bicarbonate 650 milliGRAM(s) Oral three times a day  sodium chloride 2 Gram(s) Oral three times a day  tiotropium 18 MICROgram(s) Capsule 1 Capsule(s) Inhalation daily      LABS:  03-16    129<L>  |  90<L>  |  91<H>  ----------------------------<  117<H>  4.4   |  23  |  11.43<H>    Ca    8.7      16 Mar 2020 07:55  Phos  4.0     03-16  Mg     2.8     03-16      Creatinine Trend: 11.43 <--, 11.60 <--, 11.59 <--, 12.15 <--, 12.18 <--, 12.30 <--, 12.67 <--, 13.18 <--, 13.15 <--    Phosphorus Level, Serum: 4.0 mg/dL (03-16 @ 07:55)                              8.6    6.90  )-----------( 230      ( 16 Mar 2020 07:55 )             26.0     Urine Studies:  Urinalysis - [03-06-20 @ 06:30]      Color COLORLESS / Appearance CLEAR / SG 1.008 / pH 6.5      Gluc 30 / Ketone NEGATIVE  / Bili NEGATIVE / Urobili NORMAL       Blood TRACE / Protein 20 / Leuk Est NEGATIVE / Nitrite NEGATIVE      RBC 3-5 / WBC 3-5 / Hyaline  / Gran  / Sq Epi  / Non Sq Epi FEW / Bacteria     Urine Protein 30.7      [03-12-20 @ 23:10]  Urine Sodium 88      [03-14-20 @ 05:43]  Urine Osmolality 310      [03-14-20 @ 05:43]    Iron 107, TIBC 252, %sat --      [03-06-20 @ 06:30]  Ferritin 152.9      [03-06-20 @ 06:30]  .9 (Ca --)      [03-06-20 @ 06:30]   --  HbA1c 6.0      [03-06-20 @ 06:30]  TSH 1.87      [03-11-20 @ 05:31]  Lipid: chol 183, , HDL 35,       [03-06-20 @ 06:30]    HBsAg Nonreactive      [03-06-20 @ 06:30]  HCV 0.10, Nonreactive Hepatitis C AB  S/CO Ratio                        Interpretation  < 1.00                                   Non-Reactive  1.00 - 4.99                         Weakly-Reactive  >= 5.00                                Reactive  Non-Reactive: Aperson with a non-reactive HCV antibody  result is considered uninfected.  No further action is  needed unless recent infection is suspected.  In these  cases, consider repeat testing later to detect  seroconversion..  Weakly-Reactive: HCV antibody test is abnormal, HCV RNA  Qualitative test will follow.  Reactive: HCV antibody test is abnormal, HCV RNA  Qualitative test will follow.  Note: HCV antibody testing is performed on the [employer] system.      [03-07-20 @ 05:19]  HIV Nonreactive The HIV Ag/Ab Combo test performed screens for HIV-1 p24  antigen, antibodies to HIV-1 (group M and group O), and  antibodies to HIV-2. All specimens repeatedly reactive  will reflex to an HIV 1/2 antibody confirmation and  differentiation test. This assay detects p24 antigen which  may be present prior to the development of HIV antibodies,  therefore a reactive result with a negative HIV 1/2 AB  Confirmation should be followed up with HIV-1 RNA, HIV-2  RNA and repeat testing in 4-8 weeks. A nonreactive result  does not preclude previous exposure to or infection with  HIV-1 or HIV-2.      [03-08-20 @ 05:35]    MARGARITA: titer Negative, pattern --      [03-06-20 @ 06:30]  C3 Complement 91.1      [03-06-20 @ 06:30]  C4 Complement 34.2      [03-06-20 @ 06:30]  Syphilis Screen (Treponema Pallidum Ab) Negative      [03-11-20 @ 05:31]  Free Light Chains: kappa 11.21, lambda 5.69, ratio = 1.97 H      [03-11 @ 05:31]    RADIOLOGY & ADDITIONAL STUDIES:

## 2020-03-16 NOTE — DISCHARGE NOTE NURSING/CASE MANAGEMENT/SOCIAL WORK - PATIENT PORTAL LINK FT
You can access the FollowMyHealth Patient Portal offered by [facility] by registering at the following website: [email or web address]. By joining [facility] FollowMyHealth portal, you will also be able to view your health information using other applications (apps) compatible with our system.

## 2020-03-16 NOTE — DISCHARGE NOTE NURSING/CASE MANAGEMENT/SOCIAL WORK - NSDCPEEMAIL_GEN_ALL_CORE
[facility] for Tobacco Control email [email or web address]

## 2020-03-16 NOTE — DISCHARGE NOTE NURSING/CASE MANAGEMENT/SOCIAL WORK - NSDCPEWEB_GEN_ALL_CORE
[facility] for Tobacco Control website --- [email or web address] --- [email or web address]

## 2020-03-17 LAB — GAS PNL BLDMV: SIGNIFICANT CHANGE UP

## 2020-03-31 NOTE — CHART NOTE - NSCHARTNOTEFT_GEN_A_CORE
NUTRITION SERVICES     Upon Nutritional Assessment by the Registered Dietitian your patient was determined to meet criteria/ has evidence of the following diagnosis/diagnoses:  [ ] Mild Protein Calorie Malnutrition   [ ] Moderate Protein Calorie Malnutrition   [X] Severe Protein Calorie Malnutrition   [ ] Unspecified Protein Calorie Malnutrition   [ ] Underweight / BMI <19  [ ] Morbid Obesity / BMI >40    Findings as based on:  •  Comprehensive nutritional assessment and consultation     Please refer to Initial Dietitian Evaluation 3/13/20 via documents section of [facility] for further recommendations.    Brent Naylor RD,CD/N,CDE

## 2020-04-21 LAB — RHEUMATOID FACT SERPL-ACNC: 13 IU/ML — SIGNIFICANT CHANGE UP

## 2020-09-11 ENCOUNTER — EMERGENCY (EMERGENCY)
Facility: HOSPITAL | Age: 75
LOS: 1 days | Discharge: HOME | End: 2020-09-11
Attending: EMERGENCY MEDICINE
Payer: COMMERCIAL

## 2020-09-11 VITALS
DIASTOLIC BLOOD PRESSURE: 79 MMHG | WEIGHT: 130.07 LBS | HEIGHT: 61 IN | SYSTOLIC BLOOD PRESSURE: 154 MMHG | TEMPERATURE: 98 F | OXYGEN SATURATION: 97 % | RESPIRATION RATE: 18 BRPM | HEART RATE: 80 BPM

## 2020-09-11 VITALS
OXYGEN SATURATION: 99 % | SYSTOLIC BLOOD PRESSURE: 148 MMHG | HEART RATE: 56 BPM | DIASTOLIC BLOOD PRESSURE: 72 MMHG | TEMPERATURE: 98 F | RESPIRATION RATE: 18 BRPM

## 2020-09-11 DIAGNOSIS — Z98.891 HISTORY OF UTERINE SCAR FROM PREVIOUS SURGERY: Chronic | ICD-10-CM

## 2020-09-11 DIAGNOSIS — Z90.710 ACQUIRED ABSENCE OF BOTH CERVIX AND UTERUS: Chronic | ICD-10-CM

## 2020-09-11 LAB
ALBUMIN SERPL ELPH-MCNC: 4.1 G/DL — SIGNIFICANT CHANGE UP (ref 3.3–5)
ALP SERPL-CCNC: 106 U/L — SIGNIFICANT CHANGE UP (ref 40–120)
ALT FLD-CCNC: 13 U/L — SIGNIFICANT CHANGE UP (ref 10–45)
ANION GAP SERPL CALC-SCNC: 15 MMOL/L — SIGNIFICANT CHANGE UP (ref 5–17)
APTT BLD: 28.9 SEC — SIGNIFICANT CHANGE UP (ref 27.5–35.5)
AST SERPL-CCNC: 21 U/L — SIGNIFICANT CHANGE UP (ref 10–40)
BASOPHILS # BLD AUTO: 0.05 K/UL — SIGNIFICANT CHANGE UP (ref 0–0.2)
BASOPHILS NFR BLD AUTO: 1 % — SIGNIFICANT CHANGE UP (ref 0–2)
BILIRUB SERPL-MCNC: 0.2 MG/DL — SIGNIFICANT CHANGE UP (ref 0.2–1.2)
BUN SERPL-MCNC: 24 MG/DL — HIGH (ref 7–23)
CALCIUM SERPL-MCNC: 9.8 MG/DL — SIGNIFICANT CHANGE UP (ref 8.4–10.5)
CHLORIDE SERPL-SCNC: 94 MMOL/L — LOW (ref 96–108)
CO2 SERPL-SCNC: 24 MMOL/L — SIGNIFICANT CHANGE UP (ref 22–31)
CREAT SERPL-MCNC: 4.81 MG/DL — HIGH (ref 0.5–1.3)
EOSINOPHIL # BLD AUTO: 0.15 K/UL — SIGNIFICANT CHANGE UP (ref 0–0.5)
EOSINOPHIL NFR BLD AUTO: 2.9 % — SIGNIFICANT CHANGE UP (ref 0–6)
GLUCOSE SERPL-MCNC: 107 MG/DL — HIGH (ref 70–99)
HCT VFR BLD CALC: 36.8 % — SIGNIFICANT CHANGE UP (ref 34.5–45)
HGB BLD-MCNC: 11.4 G/DL — LOW (ref 11.5–15.5)
IMM GRANULOCYTES NFR BLD AUTO: 1 % — SIGNIFICANT CHANGE UP (ref 0–1.5)
INR BLD: 0.9 RATIO — SIGNIFICANT CHANGE UP (ref 0.88–1.16)
LYMPHOCYTES # BLD AUTO: 1.19 K/UL — SIGNIFICANT CHANGE UP (ref 1–3.3)
LYMPHOCYTES # BLD AUTO: 22.8 % — SIGNIFICANT CHANGE UP (ref 13–44)
MCHC RBC-ENTMCNC: 29.4 PG — SIGNIFICANT CHANGE UP (ref 27–34)
MCHC RBC-ENTMCNC: 31 GM/DL — LOW (ref 32–36)
MCV RBC AUTO: 94.8 FL — SIGNIFICANT CHANGE UP (ref 80–100)
MONOCYTES # BLD AUTO: 0.6 K/UL — SIGNIFICANT CHANGE UP (ref 0–0.9)
MONOCYTES NFR BLD AUTO: 11.5 % — SIGNIFICANT CHANGE UP (ref 2–14)
NEUTROPHILS # BLD AUTO: 3.18 K/UL — SIGNIFICANT CHANGE UP (ref 1.8–7.4)
NEUTROPHILS NFR BLD AUTO: 60.8 % — SIGNIFICANT CHANGE UP (ref 43–77)
NRBC # BLD: 0 /100 WBCS — SIGNIFICANT CHANGE UP (ref 0–0)
PLATELET # BLD AUTO: 216 K/UL — SIGNIFICANT CHANGE UP (ref 150–400)
POTASSIUM SERPL-MCNC: 4.1 MMOL/L — SIGNIFICANT CHANGE UP (ref 3.5–5.3)
POTASSIUM SERPL-SCNC: 4.1 MMOL/L — SIGNIFICANT CHANGE UP (ref 3.5–5.3)
PROT SERPL-MCNC: 6.2 G/DL — SIGNIFICANT CHANGE UP (ref 6–8.3)
PROTHROM AB SERPL-ACNC: 10.8 SEC — SIGNIFICANT CHANGE UP (ref 10.6–13.6)
RBC # BLD: 3.88 M/UL — SIGNIFICANT CHANGE UP (ref 3.8–5.2)
RBC # FLD: 14.6 % — HIGH (ref 10.3–14.5)
SODIUM SERPL-SCNC: 133 MMOL/L — LOW (ref 135–145)
WBC # BLD: 5.22 K/UL — SIGNIFICANT CHANGE UP (ref 3.8–10.5)
WBC # FLD AUTO: 5.22 K/UL — SIGNIFICANT CHANGE UP (ref 3.8–10.5)

## 2020-09-11 PROCEDURE — 73590 X-RAY EXAM OF LOWER LEG: CPT

## 2020-09-11 PROCEDURE — 36415 COLL VENOUS BLD VENIPUNCTURE: CPT

## 2020-09-11 PROCEDURE — 99284 EMERGENCY DEPT VISIT MOD MDM: CPT | Mod: 25

## 2020-09-11 PROCEDURE — 74177 CT ABD & PELVIS W/CONTRAST: CPT | Mod: 26

## 2020-09-11 PROCEDURE — 73562 X-RAY EXAM OF KNEE 3: CPT

## 2020-09-11 PROCEDURE — 93010 ELECTROCARDIOGRAM REPORT: CPT

## 2020-09-11 PROCEDURE — 70450 CT HEAD/BRAIN W/O DYE: CPT | Mod: 26

## 2020-09-11 PROCEDURE — 71260 CT THORAX DX C+: CPT | Mod: 26

## 2020-09-11 PROCEDURE — 80053 COMPREHEN METABOLIC PANEL: CPT

## 2020-09-11 PROCEDURE — 72170 X-RAY EXAM OF PELVIS: CPT | Mod: 26,59

## 2020-09-11 PROCEDURE — 85730 THROMBOPLASTIN TIME PARTIAL: CPT

## 2020-09-11 PROCEDURE — 73610 X-RAY EXAM OF ANKLE: CPT | Mod: 26,50

## 2020-09-11 PROCEDURE — 73562 X-RAY EXAM OF KNEE 3: CPT | Mod: 26,50

## 2020-09-11 PROCEDURE — 72125 CT NECK SPINE W/O DYE: CPT

## 2020-09-11 PROCEDURE — 99285 EMERGENCY DEPT VISIT HI MDM: CPT

## 2020-09-11 PROCEDURE — 72170 X-RAY EXAM OF PELVIS: CPT

## 2020-09-11 PROCEDURE — 85610 PROTHROMBIN TIME: CPT

## 2020-09-11 PROCEDURE — 74177 CT ABD & PELVIS W/CONTRAST: CPT

## 2020-09-11 PROCEDURE — 72125 CT NECK SPINE W/O DYE: CPT | Mod: 26

## 2020-09-11 PROCEDURE — 73590 X-RAY EXAM OF LOWER LEG: CPT | Mod: 26,50

## 2020-09-11 PROCEDURE — 71260 CT THORAX DX C+: CPT

## 2020-09-11 PROCEDURE — 73502 X-RAY EXAM HIP UNI 2-3 VIEWS: CPT

## 2020-09-11 PROCEDURE — 73610 X-RAY EXAM OF ANKLE: CPT

## 2020-09-11 PROCEDURE — 70450 CT HEAD/BRAIN W/O DYE: CPT

## 2020-09-11 PROCEDURE — 85025 COMPLETE CBC W/AUTO DIFF WBC: CPT

## 2020-09-11 PROCEDURE — 93005 ELECTROCARDIOGRAM TRACING: CPT

## 2020-09-11 PROCEDURE — 73502 X-RAY EXAM HIP UNI 2-3 VIEWS: CPT | Mod: 26,LT

## 2020-09-11 RX ORDER — ACETAMINOPHEN 500 MG
975 TABLET ORAL ONCE
Refills: 0 | Status: COMPLETED | OUTPATIENT
Start: 2020-09-11 | End: 2020-09-11

## 2020-09-11 RX ADMIN — Medication 975 MILLIGRAM(S): at 14:05

## 2020-09-11 NOTE — ED ADULT TRIAGE NOTE - ESI TRIAGE ACUITY LEVEL, MLM
"Subjective   Talita Perla English is a 41 y.o. female.   Chief Complaint   Patient presents with   • Annual Exam     Annual physical.       Mrs. English is a 41 y.o. Female who presents for annual exam, but has multiple complaints today. She reports a 5-6 year history of intermittent episodes of inflammation of multiple joints and weakness, numbness, and tingling in upper and lower extremities, worse on the right side. She reports chronic fatigue worsened over 5 years. She was seen by a rheumatologist 5 years ago and referred to [facility] for specialist. Had multiple labs completed, but did not determine if her symptoms were autoimmune. Patient reports that she had been told by multiple providers that her symptoms were \"most likely autoimmune\" but she has never been officially diagnosed. She would like to see rheumatologist again to definitively diagnose her with autoimmune disorder.      Patient is seeing Dr. Jang from chronic migraine pain, which she reports is controlled. She states that her migraines are right sided, associated with facial pain, vision changes, and radiates from frontal to occipital. She reports her symptoms are moderate, but controlled most days with current regimen. She had tried botox in the past without prolonged relief. Patient also seeing neurology related to intermittent numbness, weakness complaints. She states that it comes and goes and does not have a pattern. She reports that the numbness weakness can vary in duration, but does resolve. She does have swelling in her joints that is present with the neurologic symptoms. She had EMG completed and only minor changes with right ulnar nerve. She denies stroke like symptoms and has been worked up in the past without acute findings. She denies changes in her memory or speech.     Patient reports intermittent pelvic pain. She reports that it is not present today, but does worsen with sexual intercourse. She denies vaginal discharge, itching, or " burning. She does not want pelvic exam today and prefer GYN assessment. She denies STI history. Only partner is her . Patient denies vaginal bleeding greater than normal period.     Patient was seen by [facility] cardiology for intermittent palpitations. Had Echo completed that was benign and showed only intermittent PVCs. She reports that her last episode was 2 months ago. She denies chest pain, shortness of breath, or lower extremity edema.         The following portions of the patient's history were reviewed and updated as appropriate: allergies, current medications, past family history, past medical history, past social history, past surgical history and problem list.    Review of Systems   Constitutional: Positive for fatigue. Negative for activity change, appetite change, fever, unexpected weight gain and unexpected weight loss.   HENT: Positive for facial swelling. Negative for swollen glands, trouble swallowing and voice change.    Eyes: Negative for blurred vision and visual disturbance.   Respiratory: Negative for cough and shortness of breath.    Cardiovascular: Positive for palpitations. Negative for chest pain and leg swelling.        Intermittent; last episode 2 months ago   Gastrointestinal: Negative for abdominal pain, constipation, diarrhea, nausea, vomiting and indigestion.   Endocrine: Negative for cold intolerance, heat intolerance, polydipsia and polyphagia.   Genitourinary: Positive for pelvic pain and pelvic pressure. Negative for decreased urine volume, dysuria, frequency, urgency, urinary incontinence, vaginal bleeding and vaginal discharge.   Musculoskeletal: Positive for arthralgias, back pain, joint swelling, myalgias, neck pain, neck stiffness and bursitis.   Skin: Negative for color change, rash and skin lesions.   Neurological: Positive for numbness and headache. Negative for dizziness, weakness, memory problem and confusion.   Hematological: Does not bruise/bleed easily.    Psychiatric/Behavioral: Negative for agitation, hallucinations and suicidal ideas. The patient is not nervous/anxious.        Objective   Past Medical History:   Diagnosis Date   • Disease of thyroid gland     hypothyroid   • Fibromyalgia    • GERD (gastroesophageal reflux disease)    • Migraines       Past Surgical History:   Procedure Laterality Date   •  SECTION     • ENDOSCOPY N/A 2019    Normal 1st and 2nd portions of the duodenum-biopsied; Normal stomach; LA grade A esophagitis; No esophageal abnormality to explain patient's dysphagia-esophagus dilated         Current Outpatient Medications:   •  cetirizine (zyrTEC) 10 MG tablet, TAKE 1 TABLET BY MOUTH DAILY, Disp: 30 tablet, Rfl: 0  •  cyclobenzaprine (FLEXERIL) 10 MG tablet, Take 1 tablet by mouth 3 (Three) Times a Day As Needed for Muscle Spasms., Disp: 90 tablet, Rfl: 1  •  Erenumab-aooe 140 MG/ML solution auto-injector, Inject 1 mL under the skin into the appropriate area as directed Every 30 (Thirty) Days., Disp: 1 pen, Rfl: 3  •  levETIRAcetam (KEPPRA) 500 MG tablet, Take 1 tablet in the morning, take 2 tablets at night., Disp: 270 tablet, Rfl: 3  •  Levonorgest-Eth Estrad 91-Day (AMETHIA) 0.15-0.03 &0.01 MG tablet, Take 1 tablet by mouth Daily., Disp: , Rfl:   •  levothyroxine (SYNTHROID, LEVOTHROID) 50 MCG tablet, Take 1 tablet by mouth Daily., Disp: 90 tablet, Rfl: 3  •  memantine (NAMENDA) 5 MG tablet, Take 1 tablet by mouth 2 (Two) Times a Day., Disp: 180 tablet, Rfl: 3  •  metoprolol tartrate (LOPRESSOR) 25 MG tablet, Take 1 tablet by mouth 2 (Two) Times a Day., Disp: 180 tablet, Rfl: 3  •  naproxen (Naprosyn) 500 MG tablet, Take 1 tablet by mouth 2 (Two) Times a Day As Needed for Moderate Pain  or Headache., Disp: 30 tablet, Rfl: 5  •  prochlorperazine (COMPAZINE) 10 MG tablet, Take 1 tablet by mouth Every 6 (Six) Hours As Needed for Nausea or Vomiting., Disp: 60 tablet, Rfl: 2  •  SUMAtriptan (IMITREX) 50 MG tablet, TAKE 1 TABLET  BY MOUTH AT ONSET OF HEADACHE. MAY REPEAT DOSE 1 TIME IN 2 HOURS IF HEADACHE NOT RELIEVED, Disp: 9 tablet, Rfl: 0  •  Ubrogepant 100 MG tablet, Take 100 mg by mouth As Needed (migraine). Take 1 tablet at the onset of migraine, may repeat in 1 hour if needed.  Not to exceed 2 doses in 24 hours., Disp: 10 tablet, Rfl: 12  •  pantoprazole (Protonix) 20 MG EC tablet, Take 1 tablet by mouth Daily., Disp: 30 tablet, Rfl: 0     Vitals:    06/19/20 1809   BP: 137/82   Pulse: 86   Resp:    Temp:    SpO2:          06/19/20  1345   Weight: 105 kg (231 lb)     Patient's Body mass index is 42.25 kg/m². BMI is above normal parameters. Recommendations include: educational material, exercise counseling and nutrition counseling.      Physical Exam   Constitutional: She is oriented to person, place, and time. She appears well-developed and well-nourished. She is morbidly obese.  HENT:   Head: Normocephalic and atraumatic.   Right Ear: Hearing, tympanic membrane, external ear and ear canal normal.   Left Ear: Hearing, tympanic membrane, external ear and ear canal normal.   Nose: Mucosal edema present. Right sinus exhibits no maxillary sinus tenderness and no frontal sinus tenderness. Left sinus exhibits no maxillary sinus tenderness and no frontal sinus tenderness.   Mouth/Throat: Uvula is midline and oropharynx is clear and moist.   Eyes: Pupils are equal, round, and reactive to light. Conjunctivae, EOM and lids are normal. Lids are everted and swept, no foreign bodies found.   Neck: Trachea normal and normal range of motion. Neck supple. Carotid bruit is not present. No thyromegaly present.   Cardiovascular: Normal rate, regular rhythm, normal heart sounds and intact distal pulses.   Pulmonary/Chest: Effort normal and breath sounds normal. She exhibits no mass, no tenderness and no deformity. Right breast exhibits no inverted nipple, no mass, no skin change and no tenderness. Left breast exhibits no inverted nipple, no mass and no  tenderness. Breasts are symmetrical.   Abdominal: Soft. Bowel sounds are normal. There is no tenderness.   Musculoskeletal:        Right shoulder: She exhibits bony tenderness and pain.        Left shoulder: She exhibits bony tenderness. She exhibits normal range of motion and no swelling.        Right elbow: She exhibits decreased range of motion. Tenderness found.        Left elbow: She exhibits normal range of motion.        Right wrist: She exhibits decreased range of motion and bony tenderness.        Left wrist: She exhibits bony tenderness. She exhibits normal range of motion.        Right knee: She exhibits decreased range of motion. Tenderness found. Medial joint line and lateral joint line tenderness noted.        Left knee: She exhibits decreased range of motion. Tenderness found. Medial joint line and lateral joint line tenderness noted.        Right hand: She exhibits decreased range of motion and bony tenderness. Normal sensation noted. Decreased strength noted. She exhibits thumb/finger opposition.        Left hand: She exhibits decreased range of motion and bony tenderness. Decreased strength noted. She exhibits thumb/finger opposition.        Right foot: There is decreased range of motion and bony tenderness.        Left foot: There is decreased range of motion and bony tenderness.   Lymphadenopathy:     She has no cervical adenopathy.     She has no axillary adenopathy.   Neurological: She is alert and oriented to person, place, and time. She has normal strength. She displays a negative Romberg sign.   Reflex Scores:       Tricep reflexes are 2+ on the right side and 2+ on the left side.       Bicep reflexes are 2+ on the right side and 2+ on the left side.       Patellar reflexes are 2+ on the right side and 2+ on the left side.  Skin: Skin is warm and dry. No rash noted.   Psychiatric: She has a normal mood and affect. Her behavior is normal.             Assessment/Plan   Diagnoses and all orders  for this visit:    1. Pap smear for cervical cancer screening (Primary)  -     Ambulatory Referral to Gynecology    2. Complaint of pelvic pain    3. Heartburn  -     pantoprazole (Protonix) 20 MG EC tablet; Take 1 tablet by mouth Daily.  Dispense: 30 tablet; Refill: 0    4. Palpitations    5. Essential hypertension    6. Periorbital swelling  Comments:  intermittent, episode last 3-5 days; responds to steroid pack  Orders:  -     Ambulatory Referral to Rheumatology    7. Intractable migraine without aura and without status migrainosus    8. Arthritis, multiple joint involvement  -     MINNIE  -     C-reactive protein  -     Rheumatoid Factor, Quant  -     Ambulatory Referral to Rheumatology    9. Breast cancer screening by mammogram  -     Mammo Screening Digital Tomosynthesis Bilateral With CAD; Future    10. Muscle spasms of neck  -     cyclobenzaprine (FLEXERIL) 10 MG tablet; Take 1 tablet by mouth 3 (Three) Times a Day As Needed for Muscle Spasms.  Dispense: 90 tablet; Refill: 1      Patient had many intermittent complaints. She reports monthly periorbital swelling, worsening every episode. She states that steroids have improved swelling within 2 days. Episodes last 3-5 days. She does have multiple joint complaints and reports history of joint swelling, heat, and moderate tenderness. She reports all of her symptoms are worse on the right side.  She has had multiple blood tests to rule out lyme disease, but I do not have records of her visit with Rheumatologist, She reports history and was sent for second opinion in [place]. She states that she would like a referral back to rheumatology for worsening joint pain and now the new periorbital swelling that are present with worsening of joint pain as well. Will collect rheumatoid factor, MINNIE, and Sed Rate to confirm Rheumatoid and guide further treatment options.     Patient has chronic migraines and is followed by Dr. Jang at this time.    She is requesting  referral to GYN for pap smear and to discuss pelvic pain intermittently and worse during sex. She preferred GYN pelvic exam.    She does still have intermittent palpitations, but reports that they are not as frequent as they used to. She will follow up with cardiology as needed. No further work up at this time. Reviewed labs with patient today.    She reports that her heartburn has worsened over the last couple of months and had some relief with TUMS, but she would like to trial a short course of protonix again.               4

## 2020-09-11 NOTE — ED PROVIDER NOTE - NSFOLLOWUPINSTRUCTIONS_ED_ALL_ED_FT
Keep continue your current medications.  Take Tylenol for pain as needed.  Please follow up Hemodialysis tomorrow as scheduled.  Follow up with your primary Dr. for reevaluation, call tomorrow for appointment in 2days.  Return for any concerns or worsening symptoms.
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
## 2020-09-11 NOTE — ED ADULT NURSE REASSESSMENT NOTE - NS ED NURSE REASSESS COMMENT FT1
Pt reports chest pain, NP Dorina aware, EKG completed and given to attending MD. Pt speaking in complete sentences, unlabored, spontaneous respirations, NAD. CM in place NSR HR 85.

## 2020-09-11 NOTE — ED PROVIDER NOTE - PROGRESS NOTE DETAILS
C-collar cleared. No cervical midline tender. Full ROMs of neck and nad in ct. No focal neuro deficit.
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
## 2020-09-11 NOTE — ED PROVIDER NOTE - PATIENT PORTAL LINK FT
You can access the FollowMyHealth Patient Portal offered by [facility] by registering at the following website: [email or web address]. By joining [employer]’s FollowMyHealth portal, you will also be able to view your health information using other applications (apps) compatible with our system.
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
## 2020-09-11 NOTE — ED ADULT NURSE NOTE - NSIMPLEMENTINTERV_GEN_ALL_ED
Implemented All Universal Safety Interventions:  [place] to call system. Call bell, personal items and telephone within reach. Instruct patient to call for assistance. Room bathroom lighting operational. Non-slip footwear when patient is off stretcher. Physically safe environment: no spills, clutter or unnecessary equipment. Stretcher in lowest position, wheels locked, appropriate side rails in place.

## 2020-09-14 PROBLEM — E11.9 TYPE 2 DIABETES MELLITUS WITHOUT COMPLICATIONS: Chronic | Status: ACTIVE | Noted: 2020-03-05

## 2020-09-14 PROBLEM — J44.9 CHRONIC OBSTRUCTIVE PULMONARY DISEASE, UNSPECIFIED: Chronic | Status: ACTIVE | Noted: 2020-03-06

## 2020-09-14 PROBLEM — M79.604 PAIN IN RIGHT LEG: Chronic | Status: ACTIVE | Noted: 2020-03-06

## 2020-09-14 PROBLEM — D64.9 ANEMIA, UNSPECIFIED: Chronic | Status: ACTIVE | Noted: 2020-03-06

## 2020-09-14 PROBLEM — I10 ESSENTIAL (PRIMARY) HYPERTENSION: Chronic | Status: ACTIVE | Noted: 2020-03-06

## 2020-09-14 PROBLEM — Z87.891 PERSONAL HISTORY OF NICOTINE DEPENDENCE: Chronic | Status: ACTIVE | Noted: 2020-03-06

## 2020-09-14 PROBLEM — M19.90 UNSPECIFIED OSTEOARTHRITIS, UNSPECIFIED SITE: Chronic | Status: ACTIVE | Noted: 2020-03-06

## 2020-09-14 PROBLEM — E11.9 TYPE 2 DIABETES MELLITUS WITHOUT COMPLICATIONS: Chronic | Status: ACTIVE | Noted: 2020-03-06

## 2020-11-24 NOTE — H&P ADULT - NSICDXNOPASTSURGICALHX_GEN_ALL_CORE
<-- Click to add NO significant Past Surgical History Electrodesiccation Text: The wound bed was treated with electrodesiccation after the biopsy was performed.

## 2021-03-17 NOTE — ED ADULT NURSE NOTE - NS TRANSFER PATIENT BELONGINGS
Clothing Detail Level: Detailed Detail Level: Zone Sunscreen Recommendations: Broad-spectrum sunscreen SPF 30 or greater daily, reapply at least every 2 hours.

## 2022-05-10 NOTE — PROGRESS NOTE ADULT - SUBJECTIVE AND OBJECTIVE BOX
[facility]   MD CHRIS TOPETE MD RUORU WONG, PA    TEL:  OFFICE: [phone]  DR MENDOZA CELL: [phone]  AVA WELCH CELL: [phone]  DR. MCKEON CELL: [phone]  DR. SANTANA CELL: [phone]    FROM 5 PM - 7 AM PLEASE CALL ANSWERING SERVICE: [phone]    RENAL FOLLOW UP NOTE  --------------------------------------------------------------------------------  HPI:      Pt seen and examined at bedside.   Denies SOB, chest pain     PAST HISTORY  --------------------------------------------------------------------------------  No significant changes to PMH, PSH, FHx, SHx, unless otherwise noted    ALLERGIES & MEDICATIONS  --------------------------------------------------------------------------------  Allergies    No Known Allergies    Intolerances      Standing Inpatient Medications  budesonide 160 MICROgram(s)/formoterol 4.5 MICROgram(s) Inhaler 2 Puff(s) Inhalation two times a day  dextrose 5%. 1000 milliLiter(s) IV Continuous <Continuous>  dextrose 50% Injectable 12.5 Gram(s) IV Push once  dextrose 50% Injectable 25 Gram(s) IV Push once  dextrose 50% Injectable 25 Gram(s) IV Push once  heparin  Injectable 5000 Unit(s) SubCutaneous every 8 hours  insulin lispro (HumaLOG) corrective regimen sliding scale   SubCutaneous at bedtime  insulin lispro (HumaLOG) corrective regimen sliding scale   SubCutaneous three times a day before meals  sevelamer carbonate 1600 milliGRAM(s) Oral three times a day  sodium bicarbonate 1300 milliGRAM(s) Oral three times a day  tiotropium 18 MICROgram(s) Capsule 1 Capsule(s) Inhalation daily    PRN Inpatient Medications  dextrose 40% Gel 15 Gram(s) Oral once PRN  glucagon  Injectable 1 milliGRAM(s) IntraMuscular once PRN  loratadine 10 milliGRAM(s) Oral daily PRN      REVIEW OF SYSTEMS  --------------------------------------------------------------------------------  General: no fever  CVS: no chest pain  RESP: no sob, no cough  ABD: no abdominal pain  : no dysuria,  MSK: no edema     VITALS/PHYSICAL EXAM  --------------------------------------------------------------------------------  T(C): 36.4 (03-07-20 @ 10:00), Max: 37.1 (03-06-20 @ 18:08)  HR: 82 (03-07-20 @ 10:00) (82 - 98)  BP: 141/69 (03-07-20 @ 10:00) (130/72 - 148/79)  RR: 15 (03-07-20 @ 10:00) (15 - 18)  SpO2: 100% (03-07-20 @ 10:00) (100% - 100%)  Wt(kg): --        Physical Exam:  	Gen: NAD  	HEENT: MMM  	Pulm: CTA B/L  	CV: S1S2  	Abd: Soft, +BS  	Ext: No LE edema B/L                      Neuro: Awake non focal  	Skin: Warm and Dry   	 no pressley    LABS/STUDIES  --------------------------------------------------------------------------------              7.8    4.01  >-----------<  233      [03-07-20 @ 05:19]              23.9     135  |  100  |  104  ----------------------------<  96      [03-07-20 @ 05:19]  4.6   |  14  |  13.17        Ca     9.0     [03-07-20 @ 05:19]      Mg     2.3     [03-07-20 @ 05:19]      Phos  8.6     [03-07-20 @ 05:19]    TPro  6.7  /  Alb  3.6  /  TBili  0.2  /  DBili  x   /  AST  14  /  ALT  9   /  AlkPhos  61  [03-07-20 @ 05:19]    PT/INR: PT 10.6 , INR 0.92       [03-07-20 @ 05:19]  PTT: 34.1       [03-07-20 @ 05:19]    Uric acid 5.2      [03-06-20 @ 06:30]        [03-06-20 @ 06:30]    Creatinine Trend:  SCr 13.17 [03-07 @ 05:19]  SCr 13.09 [03-06 @ 06:30]  SCr 14.04 [03-05 @ 18:30]    Urinalysis - [03-06-20 @ 06:30]      Color COLORLESS / Appearance CLEAR / SG 1.008 / pH 6.5      Gluc 30 / Ketone NEGATIVE  / Bili NEGATIVE / Urobili NORMAL       Blood TRACE / Protein 20 / Leuk Est NEGATIVE / Nitrite NEGATIVE      RBC 3-5 / WBC 3-5 / Hyaline  / Gran  / Sq Epi  / Non Sq Epi FEW / Bacteria     Urine Creatinine 36.90      [03-06-20 @ 06:30]  Urine Protein 11.8      [03-06-20 @ 06:30]    Iron 107, TIBC 252, %sat --      [03-06-20 @ 06:30]  Ferritin 152.9      [03-06-20 @ 06:30]  .9 (Ca --)      [03-06-20 @ 06:30]   --  HbA1c 6.0      [03-06-20 @ 06:30]  TSH 1.47      [03-06-20 @ 06:30]  Lipid: chol 183, , HDL 35,       [03-06-20 @ 06:30]    HBsAg Nonreactive      [03-06-20 @ 06:30]  HCV 0.10, Nonreactive Hepatitis C AB  S/CO Ratio                        Interpretation  < 1.00                                   Non-Reactive  1.00 - 4.99                         Weakly-Reactive  >= 5.00                                Reactive  Non-Reactive: Aperson with a non-reactive HCV antibody  result is considered uninfected.  No further action is  needed unless recent infection is suspected.  In these  cases, consider repeat testing later to detect  seroconversion..  Weakly-Reactive: HCV antibody test is abnormal, HCV RNA  Qualitative test will follow.  Reactive: HCV antibody test is abnormal, HCV RNA  Qualitative test will follow.  Note: HCV antibody testing is performed on the [employer] system.      [03-06-20 @ 06:30]    C3 Complement 91.1      [03-06-20 @ 06:30]  C4 Complement 34.2      [03-06-20 @ 06:30] yes

## 2024-09-27 NOTE — PROGRESS NOTE ADULT - PROBLEM SELECTOR PLAN 5
"Daily Note     Today's date: 2024  Patient name: Luis F Velazquez  : 1963  MRN: [id number]  Referring provider: Tye Stringer,*  Dx:   Encounter Diagnosis     ICD-10-CM    1. Primary osteoarthritis of both knees  M17.0                      Subjective: Pt reports he has pain in his knees, R LB and into R hip.       Objective: See treatment diary below      Assessment: Tolerated treatment poor.  Pt reports pain L knee t/o exercise. Modified program today due to Pt request and pain in his L knee. Patient would benefit from continued PT      Plan: Continue per plan of care.      Re-eval Date: 24     Precautions: HTN; DM2: restrictive lung disease         Manuals                                Neuro Re-Ed        Bosu lunges              Balance as appropriate                                          Ther Ex       NuStep  L2 10 minutes  L2 8' L2 10' L2 10 min L2  10'   HR/TR Pt declined  Pt declined  Pt declined Pt declined  x15   Standing hip flex/abd/ext 1x10 ea jamie 1x10 each b/l Pt decliend X10 ea Jamie  X10 ea jamie   Squats             LAQ 5\" x20 jamie 5\" x 20 b/l 5\" x 20 jamie  5\" x20 jamie 5\" x20 jamie   T-band HS Red 1x15 Jamie NP Red x15 jamie Red 1x15 jamie declined   Hip add Supine  5\" x20 Seated 5\" x20 Supine 5\" x 10 Supine 5\"x 10 Supine 5\" x20   Hip abd Pt declined  Declined Red x 15  Red x15 declined   SLR X10 Jamie  X10  jamie X 10 jamie  X 10 jamie declined   S/L SLR             Clamshells             Bridges  1x10  1x10 1x10 1x 10  declined   Self calf stretch Pt declined  Pt declined  declined Pt declined  declined   Self HS stretch  Pt declined  Pt declined  declined Pt declined  declined                 Ther Activity            Step-ups  Fwd/Lat    NP NP NP   Step-down    NP NP NP                               Gait Training                                         Modalities                                          " FS, sliding scale, HgbA1c 6%